# Patient Record
Sex: FEMALE | Race: WHITE | Employment: FULL TIME | URBAN - METROPOLITAN AREA
[De-identification: names, ages, dates, MRNs, and addresses within clinical notes are randomized per-mention and may not be internally consistent; named-entity substitution may affect disease eponyms.]

---

## 2016-08-04 LAB — EJECTION FRACTION: 58

## 2016-12-06 LAB
CREAT SERPL-MCNC: 0.84 MG/DL (ref 0.55–1.02)
GFR SERPL CREATININE-BSD FRML MDRD: >60 ML/MIN
GLUCOSE SERPL-MCNC: 138 MG/DL (ref 70–99)
POTASSIUM SERPL-SCNC: 3.6 MMOL/L (ref 3.5–5.1)

## 2017-01-05 DIAGNOSIS — E11.9 DIABETES MELLITUS, TYPE 2 (H): Primary | ICD-10-CM

## 2017-01-06 RX ORDER — METFORMIN HCL 500 MG
TABLET, EXTENDED RELEASE 24 HR ORAL
Qty: 270 TABLET | Refills: 3 | Status: SHIPPED | OUTPATIENT
Start: 2017-01-06 | End: 2017-10-05

## 2017-01-30 ENCOUNTER — TRANSFERRED RECORDS (OUTPATIENT)
Dept: HEALTH INFORMATION MANAGEMENT | Facility: CLINIC | Age: 72
End: 2017-01-30

## 2017-01-30 LAB — HBA1C MFR BLD: 6.3 % (ref 0–5.7)

## 2017-02-20 ENCOUNTER — TELEPHONE (OUTPATIENT)
Dept: INTERNAL MEDICINE | Facility: CLINIC | Age: 72
End: 2017-02-20

## 2017-02-20 DIAGNOSIS — R73.9 HYPERGLYCEMIA: ICD-10-CM

## 2017-02-20 NOTE — TELEPHONE ENCOUNTER
Pt stopped in pharmacy today and would like a 90 day supply of her maryanne contour next test strips if MD will approve.    Thanks!  [facility]- Float Technician  For [facility]

## 2017-02-22 DIAGNOSIS — E11.9 TYPE 2 DIABETES MELLITUS WITHOUT COMPLICATION, WITHOUT LONG-TERM CURRENT USE OF INSULIN (H): Primary | ICD-10-CM

## 2017-02-22 NOTE — TELEPHONE ENCOUNTER
Please call the patient.     Based on her current medications and great diabetes control, Medicare will only pay for once per day testing, so if she wants to test three times per day, then she is going to have to pay the difference.   The only criteria for medicare to pay for more frequent testing is for insulin use or uncontrolled/poorly controlled diabetes, neither of which is the case here.     Prescription(s) sent electronically to specified pharmacy.

## 2017-02-22 NOTE — TELEPHONE ENCOUNTER
Pharmacy calling.  Pt says she is checking blood sugars 3 times daily.  Need script to reflect this if ok.  Also would like 3 month supply.

## 2017-03-07 ENCOUNTER — TRANSFERRED RECORDS (OUTPATIENT)
Dept: HEALTH INFORMATION MANAGEMENT | Facility: CLINIC | Age: 72
End: 2017-03-07

## 2017-03-23 ENCOUNTER — TELEPHONE (OUTPATIENT)
Dept: OBGYN | Facility: CLINIC | Age: 72
End: 2017-03-23

## 2017-03-23 ENCOUNTER — OFFICE VISIT (OUTPATIENT)
Dept: OBGYN | Facility: CLINIC | Age: 72
End: 2017-03-23
Attending: OBSTETRICS & GYNECOLOGY
Payer: MEDICARE

## 2017-03-23 VITALS — WEIGHT: 254.6 LBS | BODY MASS INDEX: 37.71 KG/M2 | HEIGHT: 69 IN

## 2017-03-23 DIAGNOSIS — Z85.42 HISTORY OF ENDOMETRIAL CANCER: Primary | ICD-10-CM

## 2017-03-23 NOTE — PROGRESS NOTES
"CC/HPI:   Juju Vaughan is a 71 year old female  who presents today for her endometrial cancer surveillance visit. In 9/2014 was diagnosed with grade1, stage 1A endometrial cancer and underwent Davinci hysterectomy, BSO. Since that time she has followed with normal surveillance. Last pap of vaginal cuff was 10/2016 and was normal. She denies any pelvic pain, vaginal discharge or bleeding. Was previously having issues with urinary symptoms, however since stopping lasix her symptoms have 90% resolved. She recently had pacemaker placed which has improved her cardiac symptoms and previous struggles with afib.     HISTORIES:  Patient Active Problem List   Diagnosis     Hot thyroid nodule     Atrial fibrillation with RVR (H)     CHF (congestive heart failure) (H)     Hypothyroidism, unspecified type     Endometrial cancer (H)     CARDIOVASCULAR SCREENING; LDL GOAL LESS THAN 160     Abnormal glucose     Vitamin D deficiency     Tachycardia-bradycardia syndrome (H)     Type 2 diabetes mellitus without complication, without long-term current use of insulin (H)     Past Medical History:   Diagnosis Date     A-fib (H)     Chronic anticoagulation and antiarrhythmic drugs; has required multiple conversions     Antiplatelet or antithrombotic long-term use      Arrhythmia      ASD (atrial septal defect)     repaired at age 40     Difficulty in walking(719.7)      Eating disorder Current    Food addiction per patient; Sees private therapist at [facility]     Hypothyroidism (acquired)     Due to \"hot nodule,\" saw endocrine at [place] and was given radiation therapy, which \"killed the whole thyroid.\" No reported malignancy. Now follows with Endocrine at [facility] and on levothyroxine.     Irregular heart beat      Obesity      Obstructive sleep apnea Current    Uses CPAP; sees specialist     Rh incompatibility      Shortness of breath      Type 2 diabetes mellitus without complication (H)      Walking troubles      Past Surgical History: "   Procedure Laterality Date     BIOPSY       COLONOSCOPY N/A 3/28/2016    Procedure: COMBINED COLONOSCOPY, SINGLE OR MULTIPLE BIOPSY/POLYPECTOMY BY BIOPSY;  Surgeon: Angela Herbert MD;  [facility]     CYSTOSCOPY N/A 9/24/2014    Procedure: CYSTOSCOPY;  Surgeon: Anamaria Sher MD;  [facility]     DAVINCI HYSTERECTOMY TOTAL, BILATERAL SALPINGO-OOPHORECTOMY, COMBINED N/A 9/24/2014    Procedure: COMBINED DAVINCI HYSTERECTOMY TOTAL, SALPINGO-OOPHORECTOMY;  Surgeon: Anamaria Sher MD;  Location: [facility]     H ABLATION FOCAL AFIB       HAND SURGERY      Repair after broken hand from fall; metal kiko placed between radius and ulna     KNEE SURGERY       ORTHOPEDIC SURGERY       REPAIR ATRIAL SEPTAL DEFECT      Age 40     THORACIC SURGERY       TONSILLECTOMY       TUBAL LIGATION      After second vaginal delivery     VASCULAR SURGERY       Current Outpatient Prescriptions   Medication Sig Dispense Refill     blood glucose monitoring (JESUSITA CONTOUR NEXT) test strip Test ONE TO THREE TIMES PER  each 3     blood glucose monitoring (JESUSITA MICROLET) lancets Use to test blood sugar 1 time daily 1 Box 3     metFORMIN (GLUCOPHAGE-XR) 500 MG 24 hr tablet Take three tablets by mouth daily (1,500 mg) 270 tablet 3     levothyroxine (SYNTHROID, LEVOTHROID) 112 MCG tablet Take 1 tablet (112 mcg) by mouth daily 90 tablet 3     levalbuterol (XOPENEX HFA) 45 MCG/ACT inhaler Inhale 2 puffs into the lungs every 4 hours as needed for shortness of breath / dyspnea or wheezing       FUROSEMIDE PO Take 20 mg by mouth 2 times daily       apixaban ANTICOAGULANT (ELIQUIS) 5 MG tablet        acetaminophen (TYLENOL) 325 MG tablet Take 325-650 mg by mouth every 6 hours as needed for mild pain Take 2000 mg daily       Allergies   Allergen Reactions     Animal Dander      Contrast Dye Other (See Comments)     Family history of reaction.  Patient has not had reaction.       Flecainide Muscle Pain (Myalgia)     Iodine I 131  "Tositumomab      Morphine Hcl Itching     Percocet [Oxycodone-Acetaminophen]      Perfume Difficulty breathing     Seafood      Dehydrated with high fever     Tape [Adhesive Tape] Blisters     All tape, dermabond, except paper tape     Vicodin [Hydrocodone-Acetaminophen] Other (See Comments)     Flu-like symptoms     Social History     Social History     Marital status:      Spouse name: N/A     Number of children: N/A     Years of education: N/A     Occupational History     Not on file.     Social History Main Topics     Smoking status: Never Smoker     Smokeless tobacco: Never Used     Alcohol use No     Drug use: No     Sexual activity: Not Currently     Partners: Male     Birth control/ protection: Surgical      Comment: hysterectomy     Other Topics Concern     Not on file     Social History Narrative     Family History   Problem Relation Age of Onset     Breast Cancer No family hx of      Cancer - colorectal No family hx of      Anxiety Disorder No family hx of      DIABETES No family hx of      Hypertension No family hx of      OSTEOPOROSIS No family hx of      CEREBROVASCULAR DISEASE No family hx of      Obesity No family hx of             Review Of Systems:  C: NEGATIVE for fever, chills  E: NEGATIVE for vision changes   R: NEGATIVE for significant cough or SOB  CV: NEGATIVE for chest pain, palpitations   GI: NEGATIVE for nausea, abdominal pain, heartburn, or change in bowel habits  : NEGATIVE for frequency, dysuria, or hematuria  M: NEGATIVE for significant arthralgias or myalgia  N: NEGATIVE for weakness, dizziness or paresthesias or headache    EXAM:  Ht 1.74 m (5' 8.5\")  Wt 115.5 kg (254 lb 9.6 oz)  BMI 38.15 kg/m2  Body mass index is 38.15 kg/(m^2).    General - pleasant female in no acute distress.  Skin - no suspicious lesions or rashes  EENT-  PERRLA, euthyroid with out palpable nodules  Neck - supple without lymphadenopathy.  Lungs - clear to auscultation bilaterally.  Heart - regular " rate and rhythm without murmur.  Abdomen - soft, nontender, nondistended, no masses or organomegaly noted.  Musculoskeletal - no gross deformities.  Neurological - normal strength, sensation, and mental status.  Pelvic - EG: normal  female, vulva reveals no erythema or lesions.   BUS: within normal limits.  Vagina: atrophic, no lesions polyps or suspicious  discharge.     Cervix: surgically absent  Uterus: surgically absent  Adnexa: no masses or tenderness.  Anus- normal, no lesions.  Rectovaginal - deferred.    ASSESSMENT/PLAN  71 year old with  H/o Stage 1A, grade 1 endometrial cancer here for 6 month endometrial cancer surveillance.     Due for mammogram- will do today  Follow up in 6 months    Ayde Terry MD

## 2017-03-23 NOTE — MR AVS SNAPSHOT
After Visit Summary   3/23/2017    Juju Vaughan    MRN: [id number]           Patient Information     Date Of Birth          1945        Visit Information        Provider Department      3/23/2017 12:45 PM Ayde Terry MD [facility]        Today's Diagnoses     History of endometrial cancer    -  1       Follow-ups after your visit        Your next 10 appointments already scheduled     May 02, 2017  1:00 PM CDT   (Arrive by 12:30 PM)   RETURN ENDOCRINE with Loida Christiansen MD   [facility] End[facility] ([facility])    [address] [place] [id number] [phone]              Who to contact     Please call your clinic at [phone] to:    Ask questions about your health    Make or cancel appointments    Discuss your medicines    Learn about your test results    Speak to your doctor   If you have compliments or concerns about an experience at your clinic, or if you wish to file a complaint, please contact [facility] Patient Relations at [phone] or email us at [email or web address]         Additional Information About Your Visit        MyChart Information     [employer] gives you secure access to your electronic health record. If you see a primary care provider, you can also send messages to your care team and make appointments. If you have questions, please call your primary care clinic.  If you do not have a primary care provider, please call [phone] and they will assist you.      [employer] is an electronic gateway that provides easy, online access to your medical records. With [employer], you can request a clinic appointment, read your test results, renew a prescription or communicate with your care team.     To access your existing account, please contact your [facility] or call [phone] for assistance.        Care  "EveryWhere ID     This is your Care EveryWhere ID. This could be used by other organizations to access your [facility] medical records  [id number]        Your Vitals Were     Height BMI (Body Mass Index)                1.74 m (5' 8.5\") 38.15 kg/m2           Blood Pressure from Last 3 Encounters:   11/10/16 147/80   10/12/16 113/64   10/06/16 130/80    Weight from Last 3 Encounters:   03/23/17 115.5 kg (254 lb 9.6 oz)   11/10/16 114.3 kg (252 lb)   10/12/16 112.1 kg (247 lb 3.2 oz)              Today, you had the following     No orders found for display       Primary Care Provider Office Phone # Fax #    Michele Hernandez [phone]       [facility] [address] [place] [id number]        Thank you!     Thank you for choosing [facility]  for your care. Our goal is always to provide you with excellent care. Hearing back from our patients is one way we can continue to improve our services. Please take a few minutes to complete the written survey that you may receive in the mail after your visit with us. Thank you!             Your Updated Medication List - Protect others around you: Learn how to safely use, store and throw away your medicines at www.disposemymeds.org.          This list is accurate as of: 3/23/17  1:10 PM.  Always use your most recent med list.                   Brand Name Dispense Instructions for use    blood glucose monitoring lancets     1 Box    Use to test blood sugar 1 time daily       blood glucose monitoring test strip    JESUSITA CONTOUR NEXT    100 each    Test ONE TO THREE TIMES PER DAY       ELIQUIS 5 MG tablet   Generic drug:  apixaban ANTICOAGULANT          FUROSEMIDE PO      Take 20 mg by mouth 2 times daily       levalbuterol 45 MCG/ACT Inhaler    XOPENEX HFA     Inhale 2 puffs into the lungs every 4 hours as needed for shortness of breath / dyspnea or wheezing       levothyroxine 112 MCG tablet    SYNTHROID/LEVOTHROID    90 " tablet    Take 1 tablet (112 mcg) by mouth daily       metFORMIN 500 MG 24 hr tablet    GLUCOPHAGE-XR    270 tablet    Take three tablets by mouth daily (1,500 mg)       TYLENOL 325 MG tablet   Generic drug:  acetaminophen      Take 325-650 mg by mouth every 6 hours as needed for mild pain Take 2000 mg daily

## 2017-03-23 NOTE — TELEPHONE ENCOUNTER
----- Message from Ayde Terry MD sent at 3/23/2017  1:56 PM CDT -----  She should ask Dr. Fernando team about that. THanks!  ----- Message -----     From: Beulah Finch RN     Sent: 3/23/2017   1:18 PM       To: Ayde Terry MD    Hi Dr. Terry, As this patient was leaving she asked if you knew about the tissue she had collected after her hysterectomy that they were going to do genetic testing on. She would like this report for her children, she was unable to find it in my chart, do you have any ideas? Thank you Beulah SIMPSON

## 2017-03-23 NOTE — LETTER
"3/23/2017       RE: Juju Vaughan  [address]   [place] [address]     Dear Colleague,    Thank you for referring your patient, Juju Vaughan, to the [facility] at [facility]. Please see a copy of my visit note below.    CC/HPI:   Juju Vaughan is a 71 year old female  who presents today for her endometrial cancer surveillance visit. In 9/2014 was diagnosed with grade1, stage 1A endometrial cancer and underwent Davinci hysterectomy, BSO. Since that time she has followed with normal surveillance. Last pap of vaginal cuff was 10/2016 and was normal. She denies any pelvic pain, vaginal discharge or bleeding. Was previously having issues with urinary symptoms, however since stopping lasix her symptoms have 90% resolved. She recently had pacemaker placed which has improved her cardiac symptoms and previous struggles with afib.     HISTORIES:  Patient Active Problem List   Diagnosis     Hot thyroid nodule     Atrial fibrillation with RVR (H)     CHF (congestive heart failure) (H)     Hypothyroidism, unspecified type     Endometrial cancer (H)     CARDIOVASCULAR SCREENING; LDL GOAL LESS THAN 160     Abnormal glucose     Vitamin D deficiency     Tachycardia-bradycardia syndrome (H)     Type 2 diabetes mellitus without complication, without long-term current use of insulin (H)     Past Medical History:   Diagnosis Date     A-fib (H)     Chronic anticoagulation and antiarrhythmic drugs; has required multiple conversions     Antiplatelet or antithrombotic long-term use      Arrhythmia      ASD (atrial septal defect)     repaired at age 40     Difficulty in walking(719.7)      Eating disorder Current    Food addiction per patient; Sees private therapist at [facility]     Hypothyroidism (acquired)     Due to \"hot nodule,\" saw endocrine at [place] and was given radiation therapy, which \"killed the whole thyroid.\" No reported malignancy. Now follows with " Endocrine at [facility] and on levothyroxine.     Irregular heart beat      Obesity      Obstructive sleep apnea Current    Uses CPAP; sees specialist     Rh incompatibility      Shortness of breath      Type 2 diabetes mellitus without complication (H)      Walking troubles      Past Surgical History:   Procedure Laterality Date     BIOPSY       COLONOSCOPY N/A 3/28/2016    Procedure: COMBINED COLONOSCOPY, SINGLE OR MULTIPLE BIOPSY/POLYPECTOMY BY BIOPSY;  Surgeon: Angela Herbert MD;  [facility]     CYSTOSCOPY N/A 9/24/2014    Procedure: CYSTOSCOPY;  Surgeon: Anamaria Sher MD;  [facility]     DAVINCI HYSTERECTOMY TOTAL, BILATERAL SALPINGO-OOPHORECTOMY, COMBINED N/A 9/24/2014    Procedure: COMBINED DAVINCI HYSTERECTOMY TOTAL, SALPINGO-OOPHORECTOMY;  Surgeon: Anamaria Sher MD;  [facility]     H ABLATION FOCAL AFIB       HAND SURGERY      Repair after broken hand from fall; metal kiko placed between radius and ulna     KNEE SURGERY       ORTHOPEDIC SURGERY       REPAIR ATRIAL SEPTAL DEFECT      Age 40     THORACIC SURGERY       TONSILLECTOMY       TUBAL LIGATION      After second vaginal delivery     VASCULAR SURGERY       Current Outpatient Prescriptions   Medication Sig Dispense Refill     blood glucose monitoring (JESUSITA CONTOUR NEXT) test strip Test ONE TO THREE TIMES PER  each 3     blood glucose monitoring (JESUSITA MICROLET) lancets Use to test blood sugar 1 time daily 1 Box 3     metFORMIN (GLUCOPHAGE-XR) 500 MG 24 hr tablet Take three tablets by mouth daily (1,500 mg) 270 tablet 3     levothyroxine (SYNTHROID, LEVOTHROID) 112 MCG tablet Take 1 tablet (112 mcg) by mouth daily 90 tablet 3     levalbuterol (XOPENEX HFA) 45 MCG/ACT inhaler Inhale 2 puffs into the lungs every 4 hours as needed for shortness of breath / dyspnea or wheezing       FUROSEMIDE PO Take 20 mg by mouth 2 times daily       apixaban ANTICOAGULANT (ELIQUIS) 5 MG tablet        acetaminophen (TYLENOL) 325 MG tablet  "Take 325-650 mg by mouth every 6 hours as needed for mild pain Take 2000 mg daily       Allergies   Allergen Reactions     Animal Dander      Contrast Dye Other (See Comments)     Family history of reaction.  Patient has not had reaction.       Flecainide Muscle Pain (Myalgia)     Iodine I 131 Tositumomab      Morphine Hcl Itching     Percocet [Oxycodone-Acetaminophen]      Perfume Difficulty breathing     Seafood      Dehydrated with high fever     Tape [Adhesive Tape] Blisters     All tape, dermabond, except paper tape     Vicodin [Hydrocodone-Acetaminophen] Other (See Comments)     Flu-like symptoms     Social History     Social History     Marital status:      Spouse name: N/A     Number of children: N/A     Years of education: N/A     Occupational History     Not on file.     Social History Main Topics     Smoking status: Never Smoker     Smokeless tobacco: Never Used     Alcohol use No     Drug use: No     Sexual activity: Not Currently     Partners: Male     Birth control/ protection: Surgical      Comment: hysterectomy     Other Topics Concern     Not on file     Social History Narrative     Family History   Problem Relation Age of Onset     Breast Cancer No family hx of      Cancer - colorectal No family hx of      Anxiety Disorder No family hx of      DIABETES No family hx of      Hypertension No family hx of      OSTEOPOROSIS No family hx of      CEREBROVASCULAR DISEASE No family hx of      Obesity No family hx of             Review Of Systems:  C: NEGATIVE for fever, chills  E: NEGATIVE for vision changes   R: NEGATIVE for significant cough or SOB  CV: NEGATIVE for chest pain, palpitations   GI: NEGATIVE for nausea, abdominal pain, heartburn, or change in bowel habits  : NEGATIVE for frequency, dysuria, or hematuria  M: NEGATIVE for significant arthralgias or myalgia  N: NEGATIVE for weakness, dizziness or paresthesias or headache    EXAM:  Ht 1.74 m (5' 8.5\")  Wt 115.5 kg (254 lb 9.6 oz)  " BMI 38.15 kg/m2  Body mass index is 38.15 kg/(m^2).    General - pleasant female in no acute distress.  Skin - no suspicious lesions or rashes  EENT-  PERRLA, euthyroid with out palpable nodules  Neck - supple without lymphadenopathy.  Lungs - clear to auscultation bilaterally.  Heart - regular rate and rhythm without murmur.  Abdomen - soft, nontender, nondistended, no masses or organomegaly noted.  Musculoskeletal - no gross deformities.  Neurological - normal strength, sensation, and mental status.  Pelvic - EG: normal  female, vulva reveals no erythema or lesions.   BUS: within normal limits.  Vagina: atrophic, no lesions polyps or suspicious  discharge.     Cervix: surgically absent  Uterus: surgically absent  Adnexa: no masses or tenderness.  Anus- normal, no lesions.  Rectovaginal - deferred.    ASSESSMENT/PLAN  71 year old with  H/o Stage 1A, grade 1 endometrial cancer here for 6 month endometrial cancer surveillance.     Due for mammogram- will do today  Follow up in 6 months    Ayde Terry MD

## 2017-03-27 ENCOUNTER — MYC REFILL (OUTPATIENT)
Dept: INTERNAL MEDICINE | Facility: CLINIC | Age: 72
End: 2017-03-27

## 2017-03-27 DIAGNOSIS — E11.9 TYPE 2 DIABETES MELLITUS WITHOUT COMPLICATION, WITHOUT LONG-TERM CURRENT USE OF INSULIN (H): ICD-10-CM

## 2017-03-28 NOTE — TELEPHONE ENCOUNTER
"Message from [employer]:  Original authorizing provider: Michele Hernandez MD    Juju SCHUM[place]Julius [place] would like a refill of the following medications:  blood glucose monitoring (JESUSITA CONTOUR NEXT) test strip [Michele Hernandez MD]    Preferred pharmacy: [address]    Comment:  Greetings, My glucose strip reader, \"Contour next\" by Jesusita, is defective. The spring action no longer works. When the lancet is pressed it does not catch/click in place. If possible, I would appreciate a new prescription for one sent to [facility], the one on my chart in [place]. Many thanks in advance!   "

## 2017-04-21 ASSESSMENT — ENCOUNTER SYMPTOMS
MYALGIAS: 1
MUSCLE WEAKNESS: 1

## 2017-05-02 ENCOUNTER — OFFICE VISIT (OUTPATIENT)
Dept: ENDOCRINOLOGY | Facility: CLINIC | Age: 72
End: 2017-05-02

## 2017-05-02 VITALS
DIASTOLIC BLOOD PRESSURE: 83 MMHG | WEIGHT: 258.1 LBS | HEIGHT: 69 IN | SYSTOLIC BLOOD PRESSURE: 141 MMHG | BODY MASS INDEX: 38.23 KG/M2 | HEART RATE: 86 BPM

## 2017-05-02 DIAGNOSIS — E06.3 HASHIMOTO'S THYROIDITIS: ICD-10-CM

## 2017-05-02 DIAGNOSIS — R73.9 HYPERGLYCEMIA: ICD-10-CM

## 2017-05-02 DIAGNOSIS — E11.9 TYPE 2 DIABETES MELLITUS WITHOUT COMPLICATION, WITHOUT LONG-TERM CURRENT USE OF INSULIN (H): ICD-10-CM

## 2017-05-02 LAB
HBA1C MFR BLD: 6.6 % (ref 4.3–6)
T4 FREE SERPL-MCNC: 1.1 NG/DL (ref 0.76–1.46)
TSH SERPL DL<=0.05 MIU/L-ACNC: 2.12 MU/L (ref 0.4–4)

## 2017-05-02 RX ORDER — LEVOTHYROXINE SODIUM 112 UG/1
112 TABLET ORAL DAILY
Qty: 90 TABLET | Refills: 3 | Status: SHIPPED | OUTPATIENT
Start: 2017-05-02

## 2017-05-02 ASSESSMENT — PAIN SCALES - GENERAL: PAINLEVEL: NO PAIN (0)

## 2017-05-02 NOTE — MR AVS SNAPSHOT
After Visit Summary   5/2/2017    Juju Vaughan    MRN: [id number]           Patient Information     Date Of Birth          1945        Visit Information        Provider Department      5/2/2017 1:00 PM Loida Christiansen MD [facility]        Today's Diagnoses     Type 2 diabetes mellitus without complication, without long-term current use of insulin (H)        Hyperglycemia        Hashimoto's thyroiditis          Care Instructions    To expedite your medication refill(s), please contact your pharmacy and have them fax a refill request to: [phone].  *Please allow 3 business days for routine medication refills.  *Please allow 5 business days for controlled substance medication refills.  --------------------  For scheduling appointments (including lab work), please request an appointment through [employer], or call: [phone].    For questions for your provider or the endocrine nurse, please send a [employer] message.  For after-hours urgent issues, please dial [phone], and ask to speak with the Endocrinologist On-Call.  --------------------  Please Note: If you are active on [employer], all future test results will be sent by [employer] message only and will no longer be sent by mail. You may also receive communication directly from your physician.          Follow-ups after your visit        Follow-up notes from your care team     Return in about 6 months (around 11/2/2017).      Your next 10 appointments already scheduled     May 02, 2017  2:15 PM CDT   LAB with [facility] ([facility] [place])    [address] [place] [id number] [phone]           Patient must bring picture ID.  Patient should be prepared to give a urine specimen  Please do not eat 10-12 hours before your appointment if you are coming in fasting for labs on lipids, cholesterol, or glucose (sugar).  Pregnant women should follow their Care  Team instructions. Water with medications is okay. Do not drink coffee or other fluids.   If you have concerns about taking  your medications, please ask at office or if scheduling via [employer], send a message by clicking on Secure Messaging, Message Your Care Team.            Nov 07, 2017 12:00 PM CST   (Arrive by 11:45 AM)   RETURN ENDOCRINE with Loida Christiansen MD   [facility] ([facility] [place])    [address] [id number] [phone]              Future tests that were ordered for you today     Open Future Orders        Priority Expected Expires Ordered    T4 free Routine 5/2/2017 8/2/2017 5/2/2017    TSH Routine  5/2/2018 5/2/2017            Who to contact     Please call your clinic at [phone] to:    Ask questions about your health    Make or cancel appointments    Discuss your medicines    Learn about your test results    Speak to your doctor   If you have compliments or concerns about an experience at your clinic, or if you wish to file a complaint, please contact [facility] Patient Relations at [phone] or email us at [email or web address]         Additional Information About Your Visit        [employer] Information     [employer] gives you secure access to your electronic health record. If you see a primary care provider, you can also send messages to your care team and make appointments. If you have questions, please call your primary care clinic.  If you do not have a primary care provider, please call [phone] and they will assist you.      [employer] is an electronic gateway that provides easy, online access to your medical records. With [employer], you can request a clinic appointment, read your test results, renew a prescription or communicate with your care team.     To access your existing account, please contact your [facility] or call [phone]  "for assistance.        Care EveryWhere ID     This is your Care EveryWhere ID. This could be used by other organizations to access your [facility] medical records  [id number]        Your Vitals Were     Pulse Height BMI (Body Mass Index)             86 1.74 m (5' 8.5\") 38.67 kg/m2          Blood Pressure from Last 3 Encounters:   05/02/17 141/83   11/10/16 147/80   10/12/16 113/64    Weight from Last 3 Encounters:   05/02/17 117.1 kg (258 lb 1.6 oz)   03/23/17 115.5 kg (254 lb 9.6 oz)   11/10/16 114.3 kg (252 lb)                 Today's Medication Changes          These changes are accurate as of: 5/2/17  2:05 PM.  If you have any questions, ask your nurse or doctor.               These medicines have changed or have updated prescriptions.        Dose/Directions    blood glucose monitoring lancets   This may have changed:  additional instructions   Used for:  Hyperglycemia   Changed by:  Loida Christiansen MD        Use to test blood sugar 3 times daily   Quantity:  3 Box   Refills:  3       blood glucose monitoring test strip   Commonly known as:  JESUSITA CONTOUR NEXT   This may have changed:  additional instructions   Used for:  Type 2 diabetes mellitus without complication, without long-term current use of insulin (H)   Changed by:  Loida Christiansen MD        Test THREE TIMES PER DAY   Quantity:  270 each   Refills:  3       metFORMIN 500 MG 24 hr tablet   Commonly known as:  GLUCOPHAGE-XR   This may have changed:  additional instructions   Used for:  Diabetes mellitus, type 2 (H)        Take three tablets by mouth daily (1,500 mg)   Quantity:  270 tablet   Refills:  3            Where to get your medicines      These medications were sent to [facility] [address] [address]     Phone:  [phone]     blood glucose monitoring lancets    blood glucose monitoring test strip    levothyroxine 112 MCG tablet             "    Primary Care Provider Office Phone # Fax #    Michele Jose Hernandez [phone]       [facility] [address] [facility] [address]        Thank you!     Thank you for choosing [facility]  for your care. Our goal is always to provide you with excellent care. Hearing back from our patients is one way we can continue to improve our services. Please take a few minutes to complete the written survey that you may receive in the mail after your visit with us. Thank you!             Your Updated Medication List - Protect others around you: Learn how to safely use, store and throw away your medicines at www.disposemymeds.org.          This list is accurate as of: 5/2/17  2:05 PM.  Always use your most recent med list.                   Brand Name Dispense Instructions for use    blood glucose monitoring lancets     3 Box    Use to test blood sugar 3 times daily       blood glucose monitoring meter device kit     1 kit    Use to test blood sugars 1-3 times daily or as directed.       blood glucose monitoring test strip    JESUSITA CONTOUR NEXT    270 each    Test THREE TIMES PER DAY       ELIQUIS 5 MG tablet   Generic drug:  apixaban ANTICOAGULANT          FUROSEMIDE PO      Take 20 mg by mouth 2 times daily       levalbuterol 45 MCG/ACT Inhaler    XOPENEX HFA     Inhale 2 puffs into the lungs every 4 hours as needed for shortness of breath / dyspnea or wheezing       levothyroxine 112 MCG tablet    SYNTHROID/LEVOTHROID    90 tablet    Take 1 tablet (112 mcg) by mouth daily       metFORMIN 500 MG 24 hr tablet    GLUCOPHAGE-XR    270 tablet    Take three tablets by mouth daily (1,500 mg)       TYLENOL 325 MG tablet   Generic drug:  acetaminophen      Take 325-650 mg by mouth every 6 hours as needed for mild pain Take 2000 mg daily
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
## 2017-05-08 ENCOUNTER — TELEPHONE (OUTPATIENT)
Dept: ENDOCRINOLOGY | Facility: CLINIC | Age: 72
End: 2017-05-08

## 2017-05-08 NOTE — TELEPHONE ENCOUNTER
Dr. Christiansen,    We received a prescription for test strips for Juju on 5/2/17.  In order for Medicare to pay for the prescription we need some additional documentation in her office visit from 5/2/17.    Can you amend her office visit from 5/2/17 and add a note as to why she needs to test more than once daily which is what Medicare will cover?  She will also need to have an office visit every 6 months in order to keep Medicare paying for the prescription.    Please feel free to call me with any questions.    Thank you,    Peter Avila, PharmD  [facility]  [phone]

## 2017-06-19 ENCOUNTER — TRANSFERRED RECORDS (OUTPATIENT)
Dept: HEALTH INFORMATION MANAGEMENT | Facility: CLINIC | Age: 72
End: 2017-06-19

## 2017-07-20 ENCOUNTER — ANTICOAGULATION THERAPY VISIT (OUTPATIENT)
Dept: ANTICOAGULATION | Facility: CLINIC | Age: 72
End: 2017-07-20
Payer: MEDICARE

## 2017-07-20 DIAGNOSIS — I48.91 ATRIAL FIBRILLATION WITH RVR (H): ICD-10-CM

## 2017-07-20 LAB — INR POINT OF CARE: 1.5 (ref 0.86–1.14)

## 2017-07-20 PROCEDURE — 36416 COLLJ CAPILLARY BLOOD SPEC: CPT

## 2017-07-20 PROCEDURE — 99207 ZZC NO CHARGE NURSE ONLY: CPT

## 2017-07-20 PROCEDURE — 85610 PROTHROMBIN TIME: CPT | Mod: QW

## 2017-07-20 RX ORDER — WARFARIN SODIUM 2 MG/1
2 TABLET ORAL DAILY
COMMUNITY

## 2017-07-20 NOTE — PROGRESS NOTES
ANTICOAGULATION FOLLOW-UP CLINIC VISIT    Patient Name:  Juju Vaughan  Date:  7/20/2017  Contact Type:  Face to Face    SUBJECTIVE:     Patient Findings     Positives No Problem Findings    Comments Changing from eliquis to warfarin by gildardo matute MD           OBJECTIVE    INR Protime   Date Value Ref Range Status   07/20/2017 1.5 (A) 0.86 - 1.14 Final       ASSESSMENT / PLAN  INR assessment SUB    Recheck INR In: 4 DAYS    INR Location Clinic      Anticoagulation Summary as of 7/20/2017     INR goal 2.0-3.0   Today's INR 1.5!   Maintenance plan No maintenance plan   Full instructions 7/20: 6 mg; 7/21: 6 mg; 7/22: 4 mg; 7/23: 4 mg   Plan last modified Lo Hamlin, RN (7/20/2017)   Next INR check 7/24/2017   Target end date     Indications   Atrial fibrillation (H) (Resolved) [I48.91]  Atrial fibrillation with RVR (H) [I48.91]         Anticoagulation Episode Summary     INR check location     Preferred lab     Send INR reminders to  [facility]    Comments             See the Encounter Report to view Anticoagulation Flowsheet and Dosing Calendar (Go to Encounters tab in chart review, and find the Anticoagulation Therapy Visit)        Lo Hamlin RN

## 2017-07-20 NOTE — MR AVS SNAPSHOT
Juju Vaughan   7/20/2017 11:30 AM   Anticoagulation Therapy Visit    Description:  71 year old female   Provider:   ANTICOAGULATION CLINIC   Department:   Anti Coagulation           INR as of 7/20/2017     Today's INR 1.5!      Anticoagulation Summary as of 7/20/2017     INR goal 2.0-3.0   Today's INR 1.5!   Full instructions 7/20: 6 mg; 7/21: 6 mg; 7/22: 4 mg; 7/23: 4 mg   Next INR check 7/24/2017    Indications   Atrial fibrillation (H) (Resolved) [I48.91]  Atrial fibrillation with RVR (H) [I48.91]         Your next Anticoagulation Clinic appointment(s)     Jul 24, 2017  2:30 PM CDT   Anticoagulation Visit with  ANTICOAGULATION CLINIC   [facility] ([facility])    [address] [place] [address] [phone]              Contact Numbers     [facility]  Please call  [phone] to cancel and/or reschedule your appointment   Please call  [phone] with any problems or questions regarding your therapy.        July 2017 Details    Sun Mon Tue Wed Thu Fri Sat           1                 2               3               4               5               6               7               8                 9               10               11               12               13               14               15                 16               17               18               19               20      6 mg   See details      21      6 mg         22      4 mg           23      4 mg         24            25               26               27               28               29                 30               31                     Date Details   07/20 This INR check       Date of next INR:  7/24/2017         How to take your warfarin dose     To take:  4 mg Take 2 of the 2 mg tablets.    To take:  6 mg Take 3 of the 2 mg tablets.

## 2017-07-24 ENCOUNTER — ANTICOAGULATION THERAPY VISIT (OUTPATIENT)
Dept: ANTICOAGULATION | Facility: CLINIC | Age: 72
End: 2017-07-24
Payer: MEDICARE

## 2017-07-24 DIAGNOSIS — I48.91 A-FIB (H): Primary | ICD-10-CM

## 2017-07-24 DIAGNOSIS — I48.91 ATRIAL FIBRILLATION WITH RVR (H): ICD-10-CM

## 2017-07-24 LAB — INR POINT OF CARE: 1.8 (ref 0.86–1.14)

## 2017-07-24 PROCEDURE — 85610 PROTHROMBIN TIME: CPT | Mod: QW

## 2017-07-24 PROCEDURE — 36416 COLLJ CAPILLARY BLOOD SPEC: CPT

## 2017-07-24 RX ORDER — WARFARIN SODIUM 4 MG/1
TABLET ORAL
Qty: 180 TABLET | Refills: 0 | Status: SHIPPED | OUTPATIENT
Start: 2017-07-24 | End: 2017-10-24

## 2017-07-24 NOTE — MR AVS SNAPSHOT
Juju Vaughan   7/24/2017 2:30 PM   Anticoagulation Therapy Visit    Description:  71 year old female   Provider:   ANTICOAGULATION CLINIC   Department:   Anti Coagulation           INR as of 7/24/2017     Today's INR 1.8!      Anticoagulation Summary as of 7/24/2017     INR goal 2.0-3.0   Today's INR 1.8!   Full instructions 7/24: 8 mg; 7/25: 8 mg; 7/26: 6 mg   Next INR check 7/27/2017    Indications   Atrial fibrillation (H) (Resolved) [I48.91]  Atrial fibrillation with RVR (H) [I48.91]         Your next Anticoagulation Clinic appointment(s)     Jul 24, 2017  2:30 PM CDT   Anticoagulation Visit with  ANTICOAGULATION CLINIC   [facility]    [address] [address] [phone]            Jul 27, 2017  1:45 PM CDT   Anticoagulation Visit with  ANTICOAGULATION CLINIC   [facility] ([facility])    [address] [address] [phone]              [facility]     [facility]  Please call  [phone] to cancel and/or reschedule your appointment   Please call  [phone] with any problems or questions regarding your therapy.        July 2017 Details    Sun Mon Tue Wed Thu Fri Sat           1                 2               3               4               5               6               7               8                 9               10               11               12               13               14               15                 16               17               18               19               20               21               22                 23               24      8 mg   See details      25      8 mg         26      6 mg         27            28               29                 30               31                     Date Details   07/24 This INR check       Date of next INR:  7/27/2017         How to take your warfarin  dose     To take:  6 mg Take 1.5 of the 4 mg tablets.    To take:  8 mg Take 2 of the 4 mg tablets.

## 2017-07-24 NOTE — PROGRESS NOTES
ANTICOAGULATION FOLLOW-UP CLINIC VISIT    Patient Name:  Juju Vaughan  Date:  7/24/2017  Contact Type:  Face to Face    SUBJECTIVE:     Patient Findings     Positives No Problem Findings           OBJECTIVE    INR Protime   Date Value Ref Range Status   07/24/2017 1.8 (A) 0.86 - 1.14 Final       ASSESSMENT / PLAN  INR assessment SUB    Recheck INR In: 3 DAYS    INR Location Clinic      Anticoagulation Summary as of 7/24/2017     INR goal 2.0-3.0   Today's INR 1.8!   Maintenance plan No maintenance plan   Full instructions 7/24: 8 mg; 7/25: 8 mg; 7/26: 6 mg   Plan last modified Lo Hamlin, RN (7/24/2017)   Next INR check 7/27/2017   Target end date     Indications   Atrial fibrillation (H) (Resolved) [I48.91]  Atrial fibrillation with RVR (H) [I48.91]         Anticoagulation Episode Summary     INR check location     Preferred lab     Send INR reminders to  [facility]    Comments             See the Encounter Report to view Anticoagulation Flowsheet and Dosing Calendar (Go to Encounters tab in chart review, and find the Anticoagulation Therapy Visit)        Lo Hamlin RN

## 2017-07-27 ENCOUNTER — ANTICOAGULATION THERAPY VISIT (OUTPATIENT)
Dept: ANTICOAGULATION | Facility: CLINIC | Age: 72
End: 2017-07-27
Payer: MEDICARE

## 2017-07-27 DIAGNOSIS — I48.91 ATRIAL FIBRILLATION WITH RVR (H): ICD-10-CM

## 2017-07-27 LAB — INR POINT OF CARE: 2.3 (ref 0.86–1.14)

## 2017-07-27 PROCEDURE — 85610 PROTHROMBIN TIME: CPT | Mod: QW

## 2017-07-27 PROCEDURE — 36416 COLLJ CAPILLARY BLOOD SPEC: CPT

## 2017-07-27 PROCEDURE — 99207 ZZC NO CHARGE NURSE ONLY: CPT

## 2017-07-27 NOTE — MR AVS SNAPSHOT
Juju Vaughan   7/27/2017 1:45 PM   Anticoagulation Therapy Visit    Description:  71 year old female   Provider:   ANTICOAGULATION CLINIC   Department:   Anti Coagulation           INR as of 7/27/2017     Today's INR 2.3      Anticoagulation Summary as of 7/27/2017     INR goal 2.0-3.0   Today's INR 2.3   Full instructions 7/27: 6 mg; 7/28: 6 mg; 7/29: 6 mg; 7/30: 6 mg   Next INR check 7/31/2017    Indications   Atrial fibrillation (H) (Resolved) [I48.91]  Atrial fibrillation with RVR (H) [I48.91]         Your next Anticoagulation Clinic appointment(s)     Jul 27, 2017  1:45 PM CDT   Anticoagulation [facility] with  [facility]ATION CLINIC   [facility]    [address] [place] [id number] [phone]            Jul 31, 2017 11:00 AM CDT   Anticoagulation Visit with  ANTICOAGULATION CLINIC   [facility]    [address] [id number] [phone]              Contact Numbers     [facility]  Please call  [phone] to cancel and/or reschedule your appointment   Please call  [phone] with any problems or questions regarding your therapy.        July 2017 Details    Sun Mon Tue Wed Thu Fri Sat           1                 2               3               4               5               6               7               8                 9               10               11               12               13               14               15                 16               17               18               19               20               21               22                 23               24               25               26               27      6 mg   See details      28      6 mg         29      6 mg           30      6 mg         31                  Date Details   07/27 This INR check       Date of next INR:  7/31/2017         How to take  your warfarin dose     To take:  6 mg Take 1.5 of the 4 mg tablets.

## 2017-07-27 NOTE — PROGRESS NOTES
ANTICOAGULATION FOLLOW-UP CLINIC VISIT    Patient Name:  Juju Vaughan  Date:  7/27/2017  Contact Type:  Face to Face    SUBJECTIVE:        OBJECTIVE    INR Protime   Date Value Ref Range Status   07/27/2017 2.3 (A) 0.86 - 1.14 Final       ASSESSMENT / PLAN  INR assessment THER    Recheck INR In: 4 DAYS    INR Location Clinic      Anticoagulation Summary as of 7/27/2017     INR goal 2.0-3.0   Today's INR 2.3   Maintenance plan No maintenance plan   Full instructions 7/27: 6 mg; 7/28: 6 mg; 7/29: 6 mg; 7/30: 6 mg   Plan last modified Lo Hamlin, RN (7/24/2017)   Next INR check 7/31/2017   Target end date     Indications   Atrial fibrillation (H) (Resolved) [I48.91]  Atrial fibrillation with RVR (H) [I48.91]         Anticoagulation Episode Summary     INR check location     Preferred lab     Send INR reminders to  [facility]    Comments             See the Encounter Report to view Anticoagulation Flowsheet and Dosing Calendar (Go to Encounters tab in chart review, and find the Anticoagulation Therapy Visit)    Dosage adjustment made based on physician directed care plan.    Patricia Dinero, RN

## 2017-07-31 ENCOUNTER — ANTICOAGULATION THERAPY VISIT (OUTPATIENT)
Dept: ANTICOAGULATION | Facility: CLINIC | Age: 72
End: 2017-07-31
Payer: MEDICARE

## 2017-07-31 DIAGNOSIS — I48.91 ATRIAL FIBRILLATION WITH RVR (H): ICD-10-CM

## 2017-07-31 LAB — INR POINT OF CARE: 2.7 (ref 0.86–1.14)

## 2017-07-31 PROCEDURE — 36416 COLLJ CAPILLARY BLOOD SPEC: CPT

## 2017-07-31 PROCEDURE — 99207 ZZC NO CHARGE NURSE ONLY: CPT

## 2017-07-31 PROCEDURE — 85610 PROTHROMBIN TIME: CPT | Mod: QW

## 2017-07-31 NOTE — PROGRESS NOTES
ANTICOAGULATION FOLLOW-UP CLINIC VISIT    Patient Name:  Juju Vaughan  Date:  7/31/2017  Contact Type:  Face to Face    SUBJECTIVE:     Patient Findings     Positives Initiation of therapy           OBJECTIVE    INR Protime   Date Value Ref Range Status   07/31/2017 2.7 (A) 0.86 - 1.14 Final       ASSESSMENT / PLAN  INR assessment THER    Recheck INR In: 1 WEEK    INR Location Clinic      Anticoagulation Summary as of 7/31/2017     INR goal 2.0-3.0   Today's INR 2.7   Maintenance plan 8 mg (4 mg x 2) on Mon, Fri; 4 mg (4 mg x 1) all other days   Full instructions 7/31: 8 mg; 8/1: 6 mg; 8/2: 6 mg; 8/3: 6 mg; 8/5: 6 mg; 8/6: 6 mg; Otherwise 8 mg on Mon, Fri; 4 mg all other days   Weekly total 36 mg   Plan last modified Patricia Dinero RN (7/31/2017)   Next INR check 8/7/2017   Target end date     Indications   Atrial fibrillation (H) (Resolved) [I48.91]  Atrial fibrillation with RVR (H) [I48.91]         Anticoagulation Episode Summary     INR check location     Preferred lab     Send INR reminders to [facility]    Comments             See the Encounter Report to view Anticoagulation Flowsheet and Dosing Calendar (Go to Encounters tab in chart review, and find the Anticoagulation Therapy Visit)    Dosage adjustment made based on physician directed care plan.    Patricia Dinero RN

## 2017-07-31 NOTE — MR AVS SNAPSHOT
Juju Vaughan   7/31/2017 11:00 AM   Anticoagulation Therapy Visit    Description:  71 year old female   Provider:   ANTICOAGULATION CLINIC   Department:   Anti Coagulation           INR as of 7/31/2017     Today's INR 2.7      Anticoagulation Summary as of 7/31/2017     INR goal 2.0-3.0   Today's INR 2.7   Full instructions 7/31: 8 mg; 8/1: 6 mg; 8/2: 6 mg; 8/3: 6 mg; 8/5: 6 mg; 8/6: 6 mg; Otherwise 8 mg on Mon, Fri; 4 mg all other days   Next INR check 8/7/2017    Indications   Atrial fibrillation (H) (Resolved) [I48.91]  Atrial fibrillation with RVR (H) [I48.91]         Your next Anticoagulation Clinic appointment(s)     Aug 07, 2017 12:00 PM CDT   Anticoagulation Visit with  ANTICOAGULATION CLINIC   [facility]    [address] [address] [phone]              Contact Numbers     [facility]  Please call  [phone] to cancel and/or reschedule your appointment   Please call  [phone] with any problems or questions regarding your therapy.        July 2017 Details    Sun Mon Tue Wed Thu Fri Sat           1                 2               3               4               5               6               7               8                 9               10               11               12               13               14               15                 16               17               18               19               20               21               22                 23               24               25               26               27               28               29                 30               31      8 mg   See details            Date Details   07/31 This INR check               How to take your warfarin dose     To take:  8 mg Take 2 of the 4 mg tablets.           August 2017 Details    Sun Mon Tue Wed Thu Fri Sat       1      6 mg         2      6 mg         3      6 mg         4      8  mg         5      6 mg           6      6 mg         7            8               9               10               11               12                 13               14               15               16               17               18               19                 20               21               22               23               24               25               26                 27               28               29               30               31                  Date Details   No additional details    Date of next INR:  8/7/2017         How to take your warfarin dose     To take:  6 mg Take 1 of the 4 mg tablets and 1 of the 2 mg tablets.    To take:  8 mg Take 2 of the 4 mg tablets.

## 2017-08-07 ENCOUNTER — ANTICOAGULATION THERAPY VISIT (OUTPATIENT)
Dept: ANTICOAGULATION | Facility: CLINIC | Age: 72
End: 2017-08-07
Payer: MEDICARE

## 2017-08-07 LAB — INR POINT OF CARE: 2.7 (ref 0.86–1.14)

## 2017-08-07 PROCEDURE — 85610 PROTHROMBIN TIME: CPT | Mod: QW

## 2017-08-07 PROCEDURE — 36416 COLLJ CAPILLARY BLOOD SPEC: CPT

## 2017-08-07 NOTE — PROGRESS NOTES
ANTICOAGULATION FOLLOW-UP CLINIC VISIT    Patient Name:  Juju Vaughan  Date:  8/7/2017  Contact Type:  Face to Face    SUBJECTIVE:        OBJECTIVE    INR Protime   Date Value Ref Range Status   08/07/2017 2.7 (A) 0.86 - 1.14 Final       ASSESSMENT / PLAN  INR assessment THER    Recheck INR In: 2 WEEKS    INR Location Clinic      Anticoagulation Summary as of 8/7/2017     INR goal 2.0-3.0   Today's INR 2.7   Maintenance plan 8 mg (4 mg x 2) on Mon, Fri; 4 mg (4 mg x 1) all other days   Full instructions 8 mg on Mon, Fri; 4 mg all other days   Weekly total 36 mg   No change documented Thao Emery, RN   Plan last modified Patricia Dinero RN (7/31/2017)   Next INR check 8/21/2017   Target end date     Indications   Atrial fibrillation (H) (Resolved) [I48.91]  Atrial fibrillation with RVR (H) [I48.91]         Anticoagulation Episode Summary     INR check location     Preferred lab     Send INR reminders to  [facility]    Comments             See the Encounter Report to view Anticoagulation Flowsheet and Dosing Calendar (Go to Encounters tab in chart review, and find the Anticoagulation Therapy Visit)        Thao Emery, RN

## 2017-08-07 NOTE — MR AVS SNAPSHOT
Juju Vaughan   8/7/2017 12:00 PM   Anticoagulation Therapy Visit    Description:  71 year old female   Provider:   ANTICOAGULATION CLINIC   Department:   Anti Coagulation           INR as of 8/7/2017     Today's INR 2.7      Anticoagulation Summary as of 8/7/2017     INR goal 2.0-3.0   Today's INR 2.7   Full instructions 8 mg on Mon, Fri; 6 mg all other days   Next INR check 8/21/2017    Indications   Atrial fibrillation (H) (Resolved) [I48.91]  Atrial fibrillation with RVR (H) [I48.91]         Your next Anticoagulation Clinic appointment(s)     Aug 21, 2017 12:00 PM CDT   Anticoagulation Visit with  ANTICOAGULATION CLINIC   [facility] ([facility])    [address] [address] [phone]              Contact Numbers     [facility]  Please call  [phone] to cancel and/or reschedule your appointment   Please call  [phone] with any problems or questions regarding your therapy.        August 2017 Details    Sun Mon Tue Wed Thu Fri Sat       1               2               3               4               5                 6               7      8 mg   See details      8      6 mg         9      6 mg         10      6 mg         11      8 mg         12      6 mg           13      6 mg         14      8 mg         15      6 mg         16      6 mg         17      6 mg         18      8 mg         19      6 mg           20      6 mg         21            22               23               24               25               26                 27               28               29               30               31                  Date Details   08/07 This INR check       Date of next INR:  8/21/2017         How to take your warfarin dose     To take:  6 mg Take 1 of the 4 mg tablets and 1 of the 2 mg tablets.    To take:  8 mg Take 2 of the 4 mg tablets.

## 2017-08-21 ENCOUNTER — ANTICOAGULATION THERAPY VISIT (OUTPATIENT)
Dept: ANTICOAGULATION | Facility: CLINIC | Age: 72
End: 2017-08-21
Payer: MEDICARE

## 2017-08-21 DIAGNOSIS — I48.91 ATRIAL FIBRILLATION WITH RVR (H): ICD-10-CM

## 2017-08-21 LAB — INR POINT OF CARE: 2 (ref 0.86–1.14)

## 2017-08-21 PROCEDURE — 36416 COLLJ CAPILLARY BLOOD SPEC: CPT

## 2017-08-21 PROCEDURE — 85610 PROTHROMBIN TIME: CPT | Mod: QW

## 2017-08-21 PROCEDURE — 99207 ZZC NO CHARGE NURSE ONLY: CPT

## 2017-08-21 NOTE — PROGRESS NOTES
ANTICOAGULATION FOLLOW-UP CLINIC VISIT    Patient Name:  Juju Vaughan  Date:  8/21/2017  Contact Type:  Face to Face    SUBJECTIVE:     Patient Findings     Positives No Problem Findings           OBJECTIVE    INR Protime   Date Value Ref Range Status   08/21/2017 2.0 (A) 0.86 - 1.14 Final       ASSESSMENT / PLAN  INR assessment THER    Recheck INR In: 2 WEEKS    INR Location Clinic      Anticoagulation Summary as of 8/21/2017     INR goal 2.0-3.0   Today's INR 2.0   Maintenance plan 8 mg (4 mg x 2) on Mon, Fri; 6 mg (4 mg x 1 and 2 mg x 1) all other days   Full instructions 8 mg on Mon, Fri; 6 mg all other days   Weekly total 46 mg   Plan last modified Thao Emery RN (8/7/2017)   Next INR check 9/7/2017   Target end date     Indications   Atrial fibrillation (H) (Resolved) [I48.91]  Atrial fibrillation with RVR (H) [I48.91]         Anticoagulation Episode Summary     INR check location     Preferred lab     Send INR reminders to  [facility]    Comments             See the Encounter Report to view Anticoagulation Flowsheet and Dosing Calendar (Go to Encounters tab in chart review, and find the Anticoagulation Therapy Visit)    Dosage adjustment made based on physician directed care plan.    Patricia Dinero RN

## 2017-08-21 NOTE — MR AVS SNAPSHOT
Juju Vaughan   8/21/2017 12:00 PM   Anticoagulation Therapy Visit    Description:  71 year old female   Provider:   ANTICOAGULATION CLINIC   Department:   Anti Coagulation           INR as of 8/21/2017     Today's INR 2.0      Anticoagulation Summary as of 8/21/2017     INR goal 2.0-3.0   Today's INR 2.0   Full instructions 8 mg on Mon, Fri; 6 mg all other days   Next INR check 9/7/2017    Indications   Atrial fibrillation (H) (Resolved) [I48.91]  Atrial fibrillation with RVR (H) [I48.91]         Your next Anticoagulation Clinic appointment(s)     Sep 07, 2017 11:45 AM CDT   Anticoagulation Visit with  ANTICOAGULATION CLINIC   [facility]    [address] [place] [address] [phone]              Contact Numbers     [facility]  Please call  [phone] to cancel and/or reschedule your appointment   Please call  [phone] with any problems or questions regarding your therapy.        August 2017 Details    Sun Mon Tue Wed Thu Fri Sat       1               2               3               4               5                 6               7               8               9               10               11               12                 13               14               15               16               17               18               19                 20               21      8 mg   See details      22      6 mg         23      6 mg         24      6 mg         25      8 mg         26      6 mg           27      6 mg         28      8 mg         29      6 mg         30      6 mg         31      6 mg            Date Details   08/21 This INR check               How to take your warfarin dose     To take:  6 mg Take 1 of the 4 mg tablets and 1 of the 2 mg tablets.    To take:  8 mg Take 2 of the 4 mg tablets.           September 2017 Details    Sun Mon Tue Wed Thu Fri Sat          1      8 mg         2      6 mg            3      6 mg         4      8 mg         5      6 mg         6      6 mg         7            8               9                 10               11               12               13               14               15               16                 17               18               19               20               21               22               23                 24               25               26               27               28               29               30                Date Details   No additional details    Date of next INR:  9/7/2017         How to take your warfarin dose     To take:  6 mg Take 1 of the 4 mg tablets and 1 of the 2 mg tablets.    To take:  8 mg Take 2 of the 4 mg tablets.

## 2017-09-08 ENCOUNTER — ANTICOAGULATION THERAPY VISIT (OUTPATIENT)
Dept: ANTICOAGULATION | Facility: CLINIC | Age: 72
End: 2017-09-08
Payer: MEDICARE

## 2017-09-08 LAB — INR POINT OF CARE: 3.6 (ref 0.86–1.14)

## 2017-09-08 PROCEDURE — 85610 PROTHROMBIN TIME: CPT | Mod: QW

## 2017-09-08 PROCEDURE — 36416 COLLJ CAPILLARY BLOOD SPEC: CPT

## 2017-09-08 NOTE — PROGRESS NOTES
ANTICOAGULATION FOLLOW-UP CLINIC VISIT    Patient Name:  Juju Vaughan  Date:  9/8/2017  Contact Type:  Face to Face    SUBJECTIVE:     Patient Findings     Positives Other complaints (pt has been having having increase stress.), No Problem Findings           OBJECTIVE    INR Protime   Date Value Ref Range Status   09/08/2017 3.6 (A) 0.86 - 1.14 Final       ASSESSMENT / PLAN  INR assessment SUPRA    Recheck INR In: 1 WEEK    INR Location Clinic      Anticoagulation Summary as of 9/8/2017     INR goal 2.0-3.0   Today's INR 3.6!   Maintenance plan 6 mg (4 mg x 1 and 2 mg x 1) every day   Full instructions 9/8: 2 mg; Otherwise 6 mg every day   Weekly total 42 mg   Plan last modified Thao Emery RN (9/8/2017)   Next INR check 9/15/2017   Target end date     Indications   Atrial fibrillation (H) (Resolved) [I48.91]  Atrial fibrillation with RVR (H) [I48.91]         Anticoagulation Episode Summary     INR check location     Preferred lab     Send INR reminders to  [facility]    Comments             See the Encounter Report to view Anticoagulation Flowsheet and Dosing Calendar (Go to Encounters tab in chart review, and find the Anticoagulation Therapy Visit)        Thao Emery, RN

## 2017-09-08 NOTE — MR AVS SNAPSHOT
Juju Vaughan   9/8/2017 11:45 AM   Anticoagulation Therapy Visit    Description:  72 year old female   Provider:   ANTICOAGULATION CLINIC   Department:   Anti Coagulation           INR as of 9/8/2017     Today's INR 3.6!      Anticoagulation Summary as of 9/8/2017     INR goal 2.0-3.0   Today's INR 3.6!   Full instructions 9/8: 2 mg; Otherwise 6 mg every day   Next INR check 9/15/2017    Indications   Atrial fibrillation (H) (Resolved) [I48.91]  Atrial fibrillation with RVR (H) [I48.91]         Your next Anticoagulation Clinic appointment(s)     Sep 15, 2017  9:00 AM CDT   Anticoagulation Visit with  ANTICOAGULATION CLINIC   [facility] ([facility])    [address] [place] [address] [phone]              Contact Numbers     [facility]  Please call  [phone] to cancel and/or reschedule your appointment   Please call  [phone] with any problems or questions regarding your therapy.        September 2017 Details    Sun Mon Tue Wed Thu Fri Sat          1               2                 3               4               5               6               7               8      2 mg   See details      9      6 mg           10      6 mg         11      6 mg         12      6 mg         13      6 mg         14      6 mg         15            16                 17               18               19               20               21               22               23                 24               25               26               27               28               29               30                Date Details   09/08 This INR check       Date of next INR:  9/15/2017         How to take your warfarin dose     To take:  2 mg Take 1 of the 2 mg tablets.    To take:  6 mg Take 1 of the 4 mg tablets and 1 of the 2 mg tablets.

## 2017-09-18 ENCOUNTER — ANTICOAGULATION THERAPY VISIT (OUTPATIENT)
Dept: ANTICOAGULATION | Facility: CLINIC | Age: 72
End: 2017-09-18
Payer: MEDICARE

## 2017-09-18 DIAGNOSIS — I48.91 ATRIAL FIBRILLATION WITH RVR (H): ICD-10-CM

## 2017-09-18 LAB — INR POINT OF CARE: 1.4 (ref 0.86–1.14)

## 2017-09-18 PROCEDURE — 85610 PROTHROMBIN TIME: CPT | Mod: QW

## 2017-09-18 PROCEDURE — 99207 ZZC NO CHARGE NURSE ONLY: CPT

## 2017-09-18 PROCEDURE — 36416 COLLJ CAPILLARY BLOOD SPEC: CPT

## 2017-09-18 NOTE — PROGRESS NOTES
ANTICOAGULATION FOLLOW-UP CLINIC VISIT    Patient Name:  Juju Vaughan  Date:  9/18/2017  Contact Type:  Face to Face    SUBJECTIVE:     Patient Findings     Positives Change in diet/appetite (pt has been eating a large amount of greens ), Missed doses           OBJECTIVE    INR Protime   Date Value Ref Range Status   09/18/2017 1.4 (A) 0.86 - 1.14 Final       ASSESSMENT / PLAN  INR assessment THER    Recheck INR In: 1 WEEK    INR Location Clinic      Anticoagulation Summary as of 9/18/2017     INR goal 2.0-3.0   Today's INR 1.4!   Maintenance plan 6 mg (4 mg x 1 and 2 mg x 1) every day   Full instructions 9/18: 8 mg; Otherwise 6 mg every day   Weekly total 42 mg   Plan last modified Thao Emery RN (9/8/2017)   Next INR check 9/25/2017   Target end date     Indications   Atrial fibrillation (H) (Resolved) [I48.91]  Atrial fibrillation with RVR (H) [I48.91]         Anticoagulation Episode Summary     INR check location     Preferred lab     Send INR reminders to  [facility]    Comments             See the Encounter Report to view Anticoagulation Flowsheet and Dosing Calendar (Go to Encounters tab in chart review, and find the Anticoagulation Therapy Visit)    Dosage adjustment made based on physician directed care plan.    Patricia Dinero, TATIANA

## 2017-09-18 NOTE — MR AVS SNAPSHOT
Juju Vaughan   9/18/2017 11:30 AM   Anticoagulation Therapy Visit    Description:  72 year old female   Provider:   ANTICOAGULATION CLINIC   Department:   Anti Coagulation           INR as of 9/18/2017     Today's INR 1.4!      Anticoagulation Summary as of 9/18/2017     INR goal 2.0-3.0   Today's INR 1.4!   Full instructions 9/18: 8 mg; Otherwise 6 mg every day   Next INR check 9/25/2017    Indications   Atrial fibrillation (H) (Resolved) [I48.91]  Atrial fibrillation with RVR (H) [I48.91]         Your next Anticoagulation Clinic appointment(s)     Sep 25, 2017 11:30 AM CDT   Anticoagulation Visit with  ANTICOAGULATION CLINIC   [facility]    [address] [place] [address] [phone]              Contact Numbers     [facility]  Please call  [phone] to cancel and/or reschedule your appointment   Please call  [phone] with any problems or questions regarding your therapy.        September 2017 Details    Sun Mon Tue Wed Thu Fri Sat          1               2                 3               4               5               6               7               8               9                 10               11               12               13               14               15               16                 17               18      8 mg   See details      19      6 mg         20      6 mg         21      6 mg         22      6 mg         23      6 mg           24      6 mg         25            26               27               28               29               30                Date Details   09/18 This INR check       Date of next INR:  9/25/2017         How to take your warfarin dose     To take:  6 mg Take 1 of the 4 mg tablets and 1 of the 2 mg tablets.    To take:  8 mg Take 2 of the 4 mg tablets.

## 2017-09-25 ENCOUNTER — ANTICOAGULATION THERAPY VISIT (OUTPATIENT)
Dept: ANTICOAGULATION | Facility: CLINIC | Age: 72
End: 2017-09-25
Payer: MEDICARE

## 2017-09-25 DIAGNOSIS — I48.91 ATRIAL FIBRILLATION WITH RVR (H): ICD-10-CM

## 2017-09-25 LAB — INR POINT OF CARE: 2 (ref 0.86–1.14)

## 2017-09-25 PROCEDURE — 99207 ZZC NO CHARGE NURSE ONLY: CPT

## 2017-09-25 PROCEDURE — 85610 PROTHROMBIN TIME: CPT | Mod: QW

## 2017-09-25 PROCEDURE — 36416 COLLJ CAPILLARY BLOOD SPEC: CPT

## 2017-09-25 NOTE — PROGRESS NOTES
ANTICOAGULATION FOLLOW-UP CLINIC VISIT    Patient Name:  Juju Vaughan  Date:  9/25/2017  Contact Type:  Face to Face    SUBJECTIVE:     Patient Findings     Positives No Problem Findings           OBJECTIVE    INR Protime   Date Value Ref Range Status   09/25/2017 2.0 (A) 0.86 - 1.14 Final       ASSESSMENT / PLAN  INR assessment THER    Recheck INR In: 2 WEEKS    INR Location Clinic      Anticoagulation Summary as of 9/25/2017     INR goal 2.0-3.0   Today's INR 2.0   Maintenance plan 8 mg (4 mg x 2) on Mon; 6 mg (4 mg x 1 and 2 mg x 1) all other days   Full instructions 8 mg on Mon; 6 mg all other days   Weekly total 44 mg   Plan last modified Patricia Dinero RN (9/25/2017)   Next INR check 10/11/2017   Target end date     Indications   Atrial fibrillation (H) (Resolved) [I48.91]  Atrial fibrillation with RVR (H) [I48.91]         Anticoagulation Episode Summary     INR check location     Preferred lab     Send INR reminders to  [facility]    Comments             See the Encounter Report to view Anticoagulation Flowsheet and Dosing Calendar (Go to Encounters tab in chart review, and find the Anticoagulation Therapy Visit)    Dosage adjustment made based on physician directed care plan.    Patricia Dinero RN

## 2017-09-25 NOTE — MR AVS SNAPSHOT
Juju Vaughan   9/25/2017 11:30 AM   Anticoagulation Therapy Visit    Description:  72 year old female   Provider:   ANTICOAGULATION CLINIC   Department:   Anti Coagulation           INR as of 9/25/2017     Today's INR 2.0      Anticoagulation Summary as of 9/25/2017     INR goal 2.0-3.0   Today's INR 2.0   Full instructions 8 mg on Mon; 6 mg all other days   Next INR check 10/11/2017    Indications   Atrial fibrillation (H) (Resolved) [I48.91]  Atrial fibrillation with RVR (H) [I48.91]         Your next Anticoagulation Clinic appointment(s)     Oct 11, 2017 10:00 AM CDT   Anticoagulation Visit with  ANTICOAGULATION CLINIC   [facility] ([facility]    [address] [address] [phone]              Contact Numbers     [facility]  Please call  [phone] to cancel and/or reschedule your appointment   Please call  [phone] with any problems or questions regarding your therapy.        September 2017 Details    Sun Mon Tue Wed Thu Fri Sat          1               2                 3               4               5               6               7               8               9                 10               11               12               13               14               15               16                 17               18               19               20               21               22               23                 24               25      8 mg   See details      26      6 mg         27      6 mg         28      6 mg         29      6 mg         30      6 mg          Date Details   09/25 This INR check               How to take your warfarin dose     To take:  6 mg Take 1 of the 4 mg tablets and 1 of the 2 mg tablets.    To take:  8 mg Take 2 of the 4 mg tablets.           October 2017 Details    Sun Mon Tue Wed Thu Fri Sat     1      6 mg         2      8 mg         3      6 mg         4      6 mg          5      6 mg         6      6 mg         7      6 mg           8      6 mg         9      8 mg         10      6 mg         11            12               13               14                 15               16               17               18               19               20               21                 22               23               24               25               26               27               28                 29               30               31                    Date Details   No additional details    Date of next INR:  10/11/2017         How to take your warfarin dose     To take:  6 mg Take 1 of the 4 mg tablets and 1 of the 2 mg tablets.    To take:  8 mg Take 2 of the 4 mg tablets.

## 2017-09-28 ENCOUNTER — OFFICE VISIT (OUTPATIENT)
Dept: OBGYN | Facility: CLINIC | Age: 72
End: 2017-09-28
Attending: OBSTETRICS & GYNECOLOGY
Payer: MEDICARE

## 2017-09-28 ENCOUNTER — RADIANT APPOINTMENT (OUTPATIENT)
Dept: MAMMOGRAPHY | Facility: CLINIC | Age: 72
End: 2017-09-28
Attending: OBSTETRICS & GYNECOLOGY
Payer: MEDICARE

## 2017-09-28 VITALS
WEIGHT: 241 LBS | SYSTOLIC BLOOD PRESSURE: 119 MMHG | DIASTOLIC BLOOD PRESSURE: 73 MMHG | HEIGHT: 69 IN | HEART RATE: 72 BPM | BODY MASS INDEX: 35.7 KG/M2

## 2017-09-28 DIAGNOSIS — Z85.42 HISTORY OF ENDOMETRIAL CANCER: Primary | ICD-10-CM

## 2017-09-28 PROCEDURE — G0202 SCR MAMMO BI INCL CAD: HCPCS

## 2017-09-28 RX ORDER — ASPIRIN 81 MG/1
81 TABLET ORAL
COMMUNITY
End: 2018-01-01

## 2017-09-28 NOTE — LETTER
"9/28/2017       RE: Juju Vaughan  [address]   [facility] [address]     Dear Colleague,    Thank you for referring your patient, Juju Vaughan, to the [facility] at [facility] Please see a copy of my visit note below.    [facility] CLINIC VISIT    Juju Vaughan is a 71 year old female who presents today for her endometrial cancer surveillance visit. In 9/2014 was diagnosed with grade1, stage 1A endometrial cancer and underwent Davinci hysterectomy, BSO. Since that time she has had normal surveillance. Last pap of vaginal cuff was 10/2016 and was normal. She denies pelvic pain, vaginal bleeding or discharge. No urinary sx or constipation. Previously had urinary frequency, but attributes this to her cardiac catheterization procedures as she has had no trouble with frequency in several months since her last procedure. Still having mild incontinence, but this has improved since she has lost some weight recently.     HCM: Mammogram 3/16- normal, will have today  Colonoscopy: 3/16- normal due 2021  DEXA: 6/14- normal due 2019    Past Medical History:   Diagnosis Date     A-fib (H)     Chronic anticoagulation and antiarrhythmic drugs; has required multiple conversions     Antiplatelet or antithrombotic long-term use      Arrhythmia      ASD (atrial septal defect)     repaired at age 40     Difficulty in walking(719.7)      Eating disorder Current    Food addiction per patient; Sees private therapist at [facility]     Hypothyroidism (acquired)     Due to \"hot nodule,\" saw endocrine at [place] and was given radiation therapy, which \"killed the whole thyroid.\" No reported malignancy. Now follows with Endocrine at [facility] and on levothyroxine.     Irregular heart beat      Obesity      Obstructive sleep apnea Current    Uses CPAP; sees specialist     Rh incompatibility      Shortness of breath      Type 2 diabetes mellitus without complication (H)      Walking " "troubles      Past Surgical History:   Procedure Laterality Date     BIOPSY       COLONOSCOPY N/A 3/28/2016    Procedure: COMBINED COLONOSCOPY, SINGLE OR MULTIPLE BIOPSY/POLYPECTOMY BY BIOPSY;  Surgeon: Angela Herbert MD;  Location: [facility]     CYSTOSCOPY N/A 9/24/2014    Procedure: CYSTOSCOPY;  Surgeon: Anamaria Sher MD;  Location: [facility]     DAVINCI HYSTERECTOMY TOTAL, BILATERAL SALPINGO-OOPHORECTOMY, COMBINED N/A 9/24/2014    Procedure: COMBINED DAVINCI HYSTERECTOMY TOTAL, SALPINGO-OOPHORECTOMY;  Surgeon: Anamaria Sher MD;  Location: [facility] OR     H ABLATION FOCAL AFIB       HAND SURGERY      Repair after broken hand from fall; metal kiko placed between radius and ulna     KNEE SURGERY       ORTHOPEDIC SURGERY       REPAIR ATRIAL SEPTAL DEFECT      Age 40     THORACIC SURGERY       TONSILLECTOMY       TUBAL LIGATION      After second vaginal delivery     VASCULAR SURGERY       Family History   Problem Relation Age of Onset     Breast Cancer No family hx of      Cancer - colorectal No family hx of      Anxiety Disorder No family hx of      DIABETES No family hx of      Hypertension No family hx of      OSTEOPOROSIS No family hx of      CEREBROVASCULAR DISEASE No family hx of      Obesity No family hx of      SHx: No tobacco, alcohol, drug use   ROS: 10 point ROS neg other than the symptoms noted above in the HPI.  .    Objective:  /73  Pulse 72  Ht 1.74 m (5' 8.5\")  Wt 109.3 kg (241 lb)  BMI 36.11 kg/m2   General: Well appearing, no acute distress.   HEENT: Normal thyroid, no supraclavicular LAD  Breast: Symmetrical. No fixed or suspicious masses palpable. No skin changes or nipple changes/retraction. No axillary nodes.   Resp: Normal effort on room air.  Abdomen: Soft, non-tender, non-distended  Extremities: No edema  : Normal appearing external genitalia. No pelvic LAD. Vagina atrophic. Vaginal cuff and vaginal walls without lesions, smooth to palpation without nodularity. Cervix surgically " absent. Uterus and adnexa surgically absent.     A/P:   Juju Vaughan is a 71 year old female who presents today for her endometrial cancer surveillance visit. She is doing well, no concerns.     Due for mammogram, this was previously ordered, but pt was concerned about her pacemaker interfering with the mammogram. Confirmed with mammogram tech that her pacemaker does not preclude her from getting a mammogram. She will get her mammogram today after this visit.     Follow up: in 6 months    Virginie Kaye    Appreciate note by Dr. Kaye. Patient has been seen and examined by me with the resident, agree with above note.     Ayde Terry MD        Ob/Gyn PGY-1  09/05/17 1:08 PM

## 2017-09-28 NOTE — MR AVS SNAPSHOT
After Visit Summary   9/28/2017    Juju Vaughan    MRN: [id number]           Patient Information     Date Of Birth          1945        Visit Information        Provider Department      9/28/2017 1:45 PM Ayde Terry MD [facility]        Today's Diagnoses     History of endometrial cancer    -  1       Follow-ups after your visit        Your next 10 appointments already scheduled     Oct 05, 2017  8:45 AM CDT   Antico[facility] Visit with [facility]   [facility]    [address] [place] [id number] [phone]            Oct 05, 2017  9:20 AM CDT   Office Visit with Michele Hernandez MD   [facility]    [address] [place] [address] [phone]           Bring a current list of meds and any records pertaining to this visit. For Physicals, please bring immunization records and any forms needing to be filled out. Please arrive 10 minutes early to complete paperwork.            Nov 07, 2017 12:00 PM CST   (Arrive by 11:45 AM)   RETURN ENDOCRINE with Loida Christiansen MD   [facility] ([facility])    [address] [place] [id number] [phone]              Who to contact     Please call your clinic at [phone] to:    Ask questions about your health    Make or cancel appointments    Discuss your medicines    Learn about your test results    Speak to your doctor   If you have compliments or concerns about an experience at your clinic, or if you wish to file a complaint, please contact [facility] Patient Relations at [phone] or email us at [email or web address]         Additional Information About Your Visit        MyChart Information     [employer] gives you secure  "access to your electronic health record. If you see a primary care provider, you can also send messages to your care team and make appointments. If you have questions, please call your primary care clinic.  If you do not have a primary care provider, please call [phone] and they will assist you.      [employer] is an electronic gateway that provides easy, online access to your medical records. With [employer], you can request a clinic appointment, read your test results, renew a prescription or communicate with your care team.     To access your existing account, please contact your [facility] or call [phone] for assistance.        Care EveryWhere ID     This is your Care EveryWhere ID. This could be used by other organizations to access your [place] medical records  [id number]        Your Vitals Were     Pulse Height BMI (Body Mass Index)             72 1.74 m (5' 8.5\") 36.11 kg/m2          Blood Pressure from Last 3 Encounters:   09/28/17 119/73   05/02/17 141/83   11/10/16 147/80    Weight from Last 3 Encounters:   09/28/17 109.3 kg (241 lb)   05/02/17 117.1 kg (258 lb 1.6 oz)   03/23/17 115.5 kg (254 lb 9.6 oz)              Today, you had the following     No orders found for display         Today's Medication Changes          These changes are accurate as of: 9/28/17 11:59 PM.  If you have any questions, ask your nurse or doctor.               Stop taking these medicines if you haven't already. Please contact your care team if you have questions.     ELIQUIS 5 MG tablet   Generic drug:  apixaban ANTICOAGULANT   Stopped by:  Ayde Terry MD                    Primary Care Provider Office Phone # Fax #    Michele Hernandez [phone]       [address] [place] [id number]        Equal Access to Services     GERSON MARES : Brionna Flores, waaxda luqadaha, qaybta kaalmahyacinth cummins, saadia amin. " So [place] -[phone].    ATENCIÓN: Si bing mancilla, tiene a barker disposición servicios gratuitos de asistencia lingüística. Fiordaliza mitchell [phone].    We comply with applicable federal civil rights laws and [place] laws. We do not discriminate on the basis of race, color, national origin, age, disability, sex, sexual orientation, or gender identity.            Thank you!     Thank you for choosing [facility]  for your care. Our goal is always to provide you with excellent care. Hearing back from our patients is one way we can continue to improve our services. Please take a few minutes to complete the written survey that you may receive in the mail after your visit with us. Thank you!             Your Updated Medication List - Protect others around you: Learn how to safely use, store and throw away your medicines at www.disposemymeds.org.          This list is accurate as of: 9/28/17 11:59 PM.  Always use your most recent med list.                   Brand Name Dispense Instructions for use Diagnosis    aspirin EC 81 MG EC tablet      Take 81 mg by mouth        blood glucose monitoring lancets     3 Box    Use to test blood sugar 3 times daily    Hyperglycemia       blood glucose monitoring meter device kit     1 kit    Use to test blood sugars 1-3 times daily or as directed.    Type 2 diabetes mellitus without complication, without long-term current use of insulin (H)       blood glucose monitoring test strip    JESUSITA CONTOUR NEXT    270 each    Test THREE TIMES PER DAY    Type 2 diabetes mellitus without complication, without long-term current use of insulin (H)       FUROSEMIDE PO      Take 20 mg by mouth 2 times daily        levalbuterol 45 MCG/ACT Inhaler    XOPENEX HFA     Inhale 2 puffs into the lungs every 4 hours as needed for shortness of breath / dyspnea or wheezing        levothyroxine 112 MCG tablet    SYNTHROID/LEVOTHROID    90 tablet    Take 1 tablet (112 mcg) by mouth daily     Hashimoto's thyroiditis       metFORMIN 500 MG 24 hr tablet    GLUCOPHAGE-XR    270 tablet    Take three tablets by mouth daily (1,500 mg)    Diabetes mellitus, type 2 (H)       TYLENOL 325 MG tablet   Generic drug:  acetaminophen      Take 325-650 mg by mouth every 6 hours as needed for mild pain Take 2000 mg daily        * warfarin 2 MG tablet    COUMADIN     Take 2 mg by mouth daily        * warfarin 4 MG tablet    COUMADIN    180 tablet    Take 2 tablets daily or as directed by [facility]    A-fib (H)       * Notice:  This list has 2 medication(s) that are the same as other medications prescribed for you. Read the directions carefully, and ask your doctor or other care provider to review them with you.

## 2017-09-28 NOTE — PROGRESS NOTES
"[facility] CLINIC VISIT    Juju Vaughan is a 71 year old female who presents today for her endometrial cancer surveillance visit. In 9/2014 was diagnosed with grade1, stage 1A endometrial cancer and underwent Davinci hysterectomy, BSO. Since that time she has had normal surveillance. Last pap of vaginal cuff was 10/2016 and was normal. She denies pelvic pain, vaginal bleeding or discharge. No urinary sx or constipation. Previously had urinary frequency, but attributes this to her cardiac catheterization procedures as she has had no trouble with frequency in several months since her last procedure. Still having mild incontinence, but this has improved since she has lost some weight recently.     HCM: Mammogram 3/16- normal, will have today  Colonoscopy: 3/16- normal due 2021  DEXA: 6/14- normal due 2019    Past Medical History:   Diagnosis Date     A-fib (H)     Chronic anticoagulation and antiarrhythmic drugs; has required multiple conversions     Antiplatelet or antithrombotic long-term use      Arrhythmia      ASD (atrial septal defect)     repaired at age 40     Difficulty in walking(719.7)      Eating disorder Current    Food addiction per patient; Sees private therapist at [facility]     Hypothyroidism (acquired)     Due to \"hot nodule,\" saw endocrine at [place] and was given radiation therapy, which \"killed the whole thyroid.\" No reported malignancy. Now follows with Endocrine at [facility] and on levothyroxine.     Irregular heart beat      Obesity      Obstructive sleep apnea Current    Uses CPAP; sees specialist     Rh incompatibility      Shortness of breath      Type 2 diabetes mellitus without complication (H)      Walking troubles      Past Surgical History:   Procedure Laterality Date     BIOPSY       COLONOSCOPY N/A 3/28/2016    Procedure: COMBINED COLONOSCOPY, SINGLE OR MULTIPLE BIOPSY/POLYPECTOMY BY BIOPSY;  Surgeon: Angela Herbert MD;  Location:  GI     CYSTOSCOPY N/A 9/24/2014    Procedure: CYSTOSCOPY;  " "Surgeon: Anamaria Sher MD;  Location: [facility]     DAVINCI HYSTERECTOMY TOTAL, BILATERAL SALPINGO-OOPHORECTOMY, COMBINED N/A 9/24/2014    Procedure: COMBINED DAVINCI HYSTERECTOMY TOTAL, SALPINGO-OOPHORECTOMY;  Surgeon: Anamaria Sher MD;  Location: [facility] OR     H ABLATION FOCAL AFIB       HAND SURGERY      Repair after broken hand from fall; metal kiko placed between radius and ulna     KNEE SURGERY       ORTHOPEDIC SURGERY       REPAIR ATRIAL SEPTAL DEFECT      Age 40     THORACIC SURGERY       TONSILLECTOMY       TUBAL LIGATION      After second vaginal delivery     VASCULAR SURGERY       Family History   Problem Relation Age of Onset     Breast Cancer No family hx of      Cancer - colorectal No family hx of      Anxiety Disorder No family hx of      DIABETES No family hx of      Hypertension No family hx of      OSTEOPOROSIS No family hx of      CEREBROVASCULAR DISEASE No family hx of      Obesity No family hx of      SHx: No tobacco, alcohol, drug use   ROS: 10 point ROS neg other than the symptoms noted above in the HPI.  .    Objective:  /73  Pulse 72  Ht 1.74 m (5' 8.5\")  Wt 109.3 kg (241 lb)  BMI 36.11 kg/m2   General: Well appearing, no acute distress.   HEENT: Normal thyroid, no supraclavicular LAD  Breast: Symmetrical. No fixed or suspicious masses palpable. No skin changes or nipple changes/retraction. No axillary nodes.   Resp: Normal effort on room air.  Abdomen: Soft, non-tender, non-distended  Extremities: No edema  : Normal appearing external genitalia. No pelvic LAD. Vagina atrophic. Vaginal cuff and vaginal walls without lesions, smooth to palpation without nodularity. Cervix surgically absent. Uterus and adnexa surgically absent.     A/P:   Juju Vaughan is a 71 year old female who presents today for her endometrial cancer surveillance visit. She is doing well, no concerns.     Due for mammogram, this was previously ordered, but pt was concerned about her pacemaker " interfering with the mammogram. Confirmed with mammogram tech that her pacemaker does not preclude her from getting a mammogram. She will get her mammogram today after this visit.     Follow up: in 6 months    Virginie Kaye    Appreciate note by Dr. Kaye. Patient has been seen and examined by me with the resident, agree with above note.     Ayde Terry MD        Ob/Gyn PGY-1  09/05/17 1:08 PM

## 2017-10-05 ENCOUNTER — OFFICE VISIT (OUTPATIENT)
Dept: INTERNAL MEDICINE | Facility: CLINIC | Age: 72
End: 2017-10-05
Payer: MEDICARE

## 2017-10-05 ENCOUNTER — ANTICOAGULATION THERAPY VISIT (OUTPATIENT)
Dept: ANTICOAGULATION | Facility: CLINIC | Age: 72
End: 2017-10-05
Payer: MEDICARE

## 2017-10-05 VITALS
HEART RATE: 77 BPM | SYSTOLIC BLOOD PRESSURE: 126 MMHG | BODY MASS INDEX: 35.8 KG/M2 | DIASTOLIC BLOOD PRESSURE: 74 MMHG | TEMPERATURE: 97.7 F | WEIGHT: 238.9 LBS | OXYGEN SATURATION: 97 %

## 2017-10-05 DIAGNOSIS — Z23 NEED FOR PROPHYLACTIC VACCINATION AND INOCULATION AGAINST INFLUENZA: ICD-10-CM

## 2017-10-05 DIAGNOSIS — E55.9 VITAMIN D DEFICIENCY: ICD-10-CM

## 2017-10-05 DIAGNOSIS — I50.9 CONGESTIVE HEART FAILURE, UNSPECIFIED CONGESTIVE HEART FAILURE CHRONICITY, UNSPECIFIED CONGESTIVE HEART FAILURE TYPE: ICD-10-CM

## 2017-10-05 DIAGNOSIS — E11.9 TYPE 2 DIABETES MELLITUS WITHOUT COMPLICATION, WITHOUT LONG-TERM CURRENT USE OF INSULIN (H): Primary | ICD-10-CM

## 2017-10-05 DIAGNOSIS — I48.91 ATRIAL FIBRILLATION WITH RVR (H): ICD-10-CM

## 2017-10-05 DIAGNOSIS — E06.3 HASHIMOTO'S THYROIDITIS: ICD-10-CM

## 2017-10-05 LAB
ALBUMIN SERPL-MCNC: 4 G/DL (ref 3.4–5)
ALP SERPL-CCNC: 63 U/L (ref 40–150)
ALT SERPL W P-5'-P-CCNC: 45 U/L (ref 0–50)
ANION GAP SERPL CALCULATED.3IONS-SCNC: 8 MMOL/L (ref 3–14)
AST SERPL W P-5'-P-CCNC: 28 U/L (ref 0–45)
BASOPHILS # BLD AUTO: 0 10E9/L (ref 0–0.2)
BASOPHILS NFR BLD AUTO: 0.3 %
BILIRUB SERPL-MCNC: 0.7 MG/DL (ref 0.2–1.3)
BUN SERPL-MCNC: 13 MG/DL (ref 7–30)
CALCIUM SERPL-MCNC: 9.1 MG/DL (ref 8.5–10.1)
CHLORIDE SERPL-SCNC: 106 MMOL/L (ref 94–109)
CHOLEST SERPL-MCNC: 142 MG/DL
CO2 SERPL-SCNC: 27 MMOL/L (ref 20–32)
CREAT SERPL-MCNC: 0.71 MG/DL (ref 0.52–1.04)
DIFFERENTIAL METHOD BLD: ABNORMAL
EOSINOPHIL # BLD AUTO: 0.1 10E9/L (ref 0–0.7)
EOSINOPHIL NFR BLD AUTO: 1.4 %
ERYTHROCYTE [DISTWIDTH] IN BLOOD BY AUTOMATED COUNT: 15.7 % (ref 10–15)
GFR SERPL CREATININE-BSD FRML MDRD: 81 ML/MIN/1.7M2
GLUCOSE SERPL-MCNC: 108 MG/DL (ref 70–99)
HBA1C MFR BLD: 6.2 % (ref 4.3–6)
HCT VFR BLD AUTO: 44.6 % (ref 35–47)
HDLC SERPL-MCNC: 52 MG/DL
HGB BLD-MCNC: 13.9 G/DL (ref 11.7–15.7)
INR POINT OF CARE: 1.6 (ref 0.86–1.14)
LDLC SERPL CALC-MCNC: 75 MG/DL
LYMPHOCYTES # BLD AUTO: 2 10E9/L (ref 0.8–5.3)
LYMPHOCYTES NFR BLD AUTO: 30.8 %
MAGNESIUM SERPL-MCNC: 2.3 MG/DL (ref 1.6–2.3)
MCH RBC QN AUTO: 28.5 PG (ref 26.5–33)
MCHC RBC AUTO-ENTMCNC: 31.2 G/DL (ref 31.5–36.5)
MCV RBC AUTO: 92 FL (ref 78–100)
MONOCYTES # BLD AUTO: 0.4 10E9/L (ref 0–1.3)
MONOCYTES NFR BLD AUTO: 5.9 %
NEUTROPHILS # BLD AUTO: 4 10E9/L (ref 1.6–8.3)
NEUTROPHILS NFR BLD AUTO: 61.6 %
NONHDLC SERPL-MCNC: 90 MG/DL
PLATELET # BLD AUTO: 157 10E9/L (ref 150–450)
POTASSIUM SERPL-SCNC: 4.2 MMOL/L (ref 3.4–5.3)
PROT SERPL-MCNC: 7.7 G/DL (ref 6.8–8.8)
RBC # BLD AUTO: 4.87 10E12/L (ref 3.8–5.2)
SODIUM SERPL-SCNC: 141 MMOL/L (ref 133–144)
TRIGL SERPL-MCNC: 77 MG/DL
TSH SERPL DL<=0.005 MIU/L-ACNC: 2.69 MU/L (ref 0.4–4)
WBC # BLD AUTO: 6.5 10E9/L (ref 4–11)

## 2017-10-05 PROCEDURE — 83735 ASSAY OF MAGNESIUM: CPT | Performed by: INTERNAL MEDICINE

## 2017-10-05 PROCEDURE — 83036 HEMOGLOBIN GLYCOSYLATED A1C: CPT | Performed by: INTERNAL MEDICINE

## 2017-10-05 PROCEDURE — 82306 VITAMIN D 25 HYDROXY: CPT | Performed by: INTERNAL MEDICINE

## 2017-10-05 PROCEDURE — 80061 LIPID PANEL: CPT | Performed by: INTERNAL MEDICINE

## 2017-10-05 PROCEDURE — G0008 ADMIN INFLUENZA VIRUS VAC: HCPCS | Performed by: INTERNAL MEDICINE

## 2017-10-05 PROCEDURE — 90662 IIV NO PRSV INCREASED AG IM: CPT | Performed by: INTERNAL MEDICINE

## 2017-10-05 PROCEDURE — 99214 OFFICE O/P EST MOD 30 MIN: CPT | Mod: 25 | Performed by: INTERNAL MEDICINE

## 2017-10-05 PROCEDURE — 36416 COLLJ CAPILLARY BLOOD SPEC: CPT

## 2017-10-05 PROCEDURE — 80050 GENERAL HEALTH PANEL: CPT | Performed by: INTERNAL MEDICINE

## 2017-10-05 PROCEDURE — 85610 PROTHROMBIN TIME: CPT | Mod: QW

## 2017-10-05 PROCEDURE — 99207 ZZC NO CHARGE NURSE ONLY: CPT

## 2017-10-05 NOTE — PROGRESS NOTES
Injectable Influenza Immunization Documentation    1.  Is the person to be vaccinated sick today?   No    2. Does the person to be vaccinated have an allergy to a component   of the vaccine?   No    3. Has the person to be vaccinated ever had a serious reaction   to influenza vaccine in the past?   No    4. Has the person to be vaccinated ever had Guillain-Barré syndrome?   No    Form completed by Chantale Irving CMA

## 2017-10-05 NOTE — MR AVS SNAPSHOT
Juju Vaughan   10/5/2017 8:45 AM   Anticoagulation Therapy Visit    Description:  72 year old female   Provider:   ANTICOAGULATION CLINIC   Department:   Anti Coagulation           INR as of 10/5/2017     Today's INR 1.6!      Anticoagulation Summary as of 10/5/2017     INR goal 2.0-3.0   Today's INR 1.6!   Full instructions 10/5: 8 mg; Otherwise 8 mg on Mon; 6 mg all other days   Next INR check 10/13/2017    Indications   Atrial fibrillation (H) (Resolved) [I48.91]  Atrial fibrillation with RVR (H) [I48.91]         Description     Stopped asa       Your next Anticoagulation Clinic appointment(s)     Oct 13, 2017 11:30 AM CDT   An[facility]agulation [facility]  [facility]    [address] [address] [phone]              Contact Numbers     [facility]  Please call  [phone] to cancel and/or reschedule your appointment   Please call  [phone] with any problems or questions regarding your therapy.        October 2017 Details    Sun Mon Tue Wed Thu Fri Sat     1               2               3               4               5      8 mg   See details      6      6 mg         7      6 mg           8      6 mg         9      8 mg         10      6 mg         11      6 mg         12      6 mg         13            14                 15               16               17               18               19               20               21                 22               23               24               25               26               27               28                 29               30               31                    Date Details   10/05 This INR check       Date of next INR:  10/13/2017         How to take your warfarin dose     To take:  6 mg Take 1 of the 4 mg tablets and 1 of the 2 mg tablets.    To take:  8 mg Take 2 of the 4 mg tablets.

## 2017-10-05 NOTE — PATIENT INSTRUCTIONS
"*  Continue all medications at the same doses.  Contact your usual pharmacy if you need refills.     *  continue your diet changes.     *  Return to see me in approximately 6 months, sooner if needed.  Please get nonfasting labs done in the [facility] any other [facility] lab 1-2 days before this appointment.  If you get the labs done at another clinic, make arrangements with that clinic directly.  The orders will be in place.  Call [phone] to schedule appointments at [facility].     5 GOALS IN MANAGING DIABETES (i.e. to give the best chance to prevent diabetic complications and vascular disease):     1.  Aggressive blood pressure control, under 130/80 ideally.  Using medications if needed    Your blood pressure is under good control    BP Readings from Last 4 Encounters:   10/05/17 126/74   09/28/17 119/73   05/02/17 141/83   11/10/16 147/80       2.  Aggressive LDL cholesterol (bad cholesterol) lowering as needed.  Your goal is an LDL under 100 for sure, preferably under 70.     *  All patients with diabetes are recommended to be on a \"statin\" cholesterol lowering medication regardless of the cholesterol levels to give the best chance at avoiding vascular disease.      New guidelines identify four high-risk groups who could benefit from statins:   *people with pre-existing heart disease, such as those who have had a heart attack;   *people ages 40 to 75 who have diabetes of any type  *patients ages 40 to 75 with at least a 7.5% risk of developing cardiovascular disease over the next decade, according to a formula described in the guidelines  *patients with the sort of super-high cholesterol that sometimes runs in families, as evidenced by an LDL of 190 milligrams per deciliter or higher    *  Your cholesterol levels are well controlled.    Recent Labs   Lab Test  05/02/16   1059  10/09/14   1004  02/07/11   1213   CHOL  108  121  167   HDL  37*  36*  44*   LDL  54  67  98   TRIG  84  92  124 " "  CHOLHDLRATIO   --   3.4  4.0       3.  Aggressive diabetic management.  The target for A1C (3 months average blood sugar) is ideally below 6.5, preferably below 7.5    Your diabetes is under good control.      Lab Results   Component Value Date    A1C 6.3 01/30/2017    A1C 6.5 09/14/2016    A1C 6.7 05/02/2016    A1C 7.0 12/27/2014    A1C 6.4 04/22/2014       4.  No smoking    5.  Aspirin tablet once per day, every day unless there is a specific reason that you cannot take aspirin.       *You should take Aspirin 81 mg once per day.         .       --Good Grains:  Oats, brown rice, Quinoa (these do not raise the blood sugar as much)     --Bad grains:  Anything made from wheat or white rice     (because these raise the blood sugars significantly, and the possible gluten issue from wheat for some people).      --Proteins:  Aim for \"lean proteins\" including chicken, fish, seafood, pork, turkey, and eggs (in moderation); Eat red meat only occasionally          Jamin Meraz:                             "

## 2017-10-05 NOTE — PROGRESS NOTES
SUBJECTIVE:   Juju Vaughan is a 72 year old female who presents to clinic today for the following health issues:    Is requesting testing frequency for TID    Diabetes Follow-up  Patient is checking blood sugars: twice daily.    Blood sugar testing frequency justification: Frequent hypoglycemia and Patient modifying lifestyle changes (diet, exercise) with blood sugars  Results are as follows:       am - 125-140            postprandial after lunch- 100-110    Diabetic concerns: None     Symptoms of hypoglycemia (low blood sugar): none     Paresthesias (numbness or burning in feet) or sores: No   Date of last diabetic eye exam: 08/2017    In regards specifically to the patient's diabetes, they reports no unusual symptoms.    No significnat or regular episodes of hypo or hyperglycemia    Medication compliance: compliant most of the time  Diabetic diet compliance: compliant most of the time  Diabetic ROS: no polyuria or polydipsia, no chest pain, dyspnea or TIA's, no numbness, tingling or pain in extremities    Home blood sugar monitoring: are performed regulary    Review of patients self blood glucose monitoring shows fasting always < 130 and post prandial average under 170    Diabetic complications: none     Most  recent labs:    Lab Results   Component Value Date    A1C 6.3 01/30/2017    A1C 6.5 09/14/2016    A1C 6.7 05/02/2016    A1C 7.0 12/27/2014    A1C 6.4 04/22/2014            Hypothyroidism Follow-up    Since last visit, patient describes the following symptoms: Weight stable, no hair loss, no skin changes, no constipation, no loose stools      Amount of exercise or physical activity: None    Problems taking medications regularly: No    Medication side effects: none  Diet: diabetic      3.  Prior of coronary artery disease as listed in medical history.  No current or recent cardiovascaulr symptoms.   No shortness of breath, no episodes of chest pain/pressure, no dyspnea on exertion, no changes in his  "abiliy to perform physical exertion or tasks.  Takes the same amount of time to perform similar physical tasks.        Problem list and histories reviewed & adjusted, as indicated.  Additional history: as documented        Reviewed and updated as needed this visit by clinical staff       Reviewed and updated as needed this visit by Provider             Past Medical History:  ---------------------------  Past Medical History:   Diagnosis Date     A-fib (H)     Chronic anticoagulation and antiarrhythmic drugs; has required multiple conversions     Antiplatelet or antithrombotic long-term use      Arrhythmia      ASD (atrial septal defect)     repaired at age 40     Difficulty in walking(719.7)      Eating disorder Current    Food addiction per patient; Sees private therapist at [facility]     Hypothyroidism (acquired)     Due to \"hot nodule,\" saw endocrine at [place] and was given radiation therapy, which \"killed the whole thyroid.\" No reported malignancy. Now follows with Endocrine at [facility] and on levothyroxine.     Irregular heart beat      Obesity      Obstructive sleep apnea Current    Uses CPAP; sees specialist     Rh incompatibility      Shortness of breath      Type 2 diabetes mellitus without complication (H)      Walking troubles        Past Surgical History:  ---------------------------  Past Surgical History:   Procedure Laterality Date     BIOPSY       COLONOSCOPY N/A 3/28/2016    Procedure: COMBINED COLONOSCOPY, SINGLE OR MULTIPLE BIOPSY/POLYPECTOMY BY BIOPSY;  Surgeon: Angela Herbert MD;  Location:  GI     CYSTOSCOPY N/A 9/24/2014    Procedure: CYSTOSCOPY;  Surgeon: Anamaria Sher MD;  Location: [facility] OR     DAVINCI HYSTERECTOMY TOTAL, BILATERAL SALPINGO-OOPHORECTOMY, COMBINED N/A 9/24/2014    Procedure: COMBINED DAVINCI HYSTERECTOMY TOTAL, SALPINGO-OOPHORECTOMY;  Surgeon: Anamaria Sher MD;  Location: [facility] OR     H ABLATION FOCAL AFIB       HAND SURGERY      Repair after broken hand from fall; metal " kiko placed between radius and ulna     KNEE SURGERY       ORTHOPEDIC SURGERY       REPAIR ATRIAL SEPTAL DEFECT      Age 40     THORACIC SURGERY       TONSILLECTOMY       TUBAL LIGATION      After second vaginal delivery     VASCULAR SURGERY         Current Medications:  ---------------------------  Current Outpatient Prescriptions   Medication Sig Dispense Refill     MELATONIN PO Take 5 mg by mouth       aspirin EC 81 MG EC tablet Take 81 mg by mouth       warfarin (COUMADIN) 4 MG tablet Take 2 tablets daily or as directed by [facility] 180 tablet 0     warfarin (COUMADIN) 2 MG tablet Take 2 mg by mouth daily       blood glucose monitoring (JESUSITA CONTOUR NEXT) test strip Test THREE TIMES PER  each 3     blood glucose monitoring (JESUSITA MICROLET) lancets Use to test blood sugar 3 times daily 3 Box 3     levothyroxine (SYNTHROID/LEVOTHROID) 112 MCG tablet Take 1 tablet (112 mcg) by mouth daily 90 tablet 3     blood glucose monitoring (JESUSITA CONTOUR MONITOR) meter device kit Use to test blood sugars 1-3 times daily or as directed. 1 kit 0     metFORMIN (GLUCOPHAGE-XR) 500 MG 24 hr tablet Take three tablets by mouth daily (1,500 mg) (Patient not taking: Reported on 10/5/2017) 270 tablet 3     levalbuterol (XOPENEX HFA) 45 MCG/ACT inhaler Inhale 2 puffs into the lungs every 4 hours as needed for shortness of breath / dyspnea or wheezing       FUROSEMIDE PO Take 20 mg by mouth 2 times daily       acetaminophen (TYLENOL) 325 MG tablet Take 325-650 mg by mouth every 6 hours as needed for mild pain Take 2000 mg daily         Allergies:  -------------  Allergies   Allergen Reactions     Animal Dander      Contrast Dye Other (See Comments)     Family history of reaction.  Patient has not had reaction.       Flecainide Muscle Pain (Myalgia)     Iodine I 131 Tositumomab      Morphine Hcl Itching     Percocet [Oxycodone-Acetaminophen]      Perfume Difficulty breathing     Seafood      Dehydrated with high fever     Tape  [Adhesive Tape] Blisters     All tape, dermabond, except paper tape     Vicodin [Hydrocodone-Acetaminophen] Other (See Comments)     Flu-like symptoms     Walnuts [Nuts]        Social History:  -------------------  Social History     Social History     Marital status:      Spouse name: N/A     Number of children: N/A     Years of education: N/A     Occupational History     Not on file.     Social History Main Topics     Smoking status: Never Smoker     Smokeless tobacco: Never Used     Alcohol use No     Drug use: No     Sexual activity: Not Currently     Partners: Male     Birth control/ protection: Surgical      Comment: hysterectomy     Other Topics Concern     Not on file     Social History Narrative       Family Medical History:  ------------------------------  Family History   Problem Relation Age of Onset     Breast Cancer No family hx of      Cancer - colorectal No family hx of      Anxiety Disorder No family hx of      DIABETES No family hx of      Hypertension No family hx of      OSTEOPOROSIS No family hx of      CEREBROVASCULAR DISEASE No family hx of      Obesity No family hx of          ROS:  REVIEW OF SYSTEMS:    RESP: negative for cough, dyspnea, wheezing, hemoptysis  CV: negative for chest pain, palpitations, PND, RANGEL, orthopnea; reports no changes in their ability to perform physical activity (from cardiovascular standpoint)  GI: negative for dysphagia, N/V, pain, melena, diarrhea and constipation  NEURO: negative for numbness/tingling, paralysis, incoordination, or focal weakness     OBJECTIVE:                                                    /74  Pulse 77  Temp 97.7  F (36.5  C) (Oral)  Wt 238 lb 14.4 oz (108.4 kg)  SpO2 97%  BMI 35.8 kg/m2     GENERAL alert and no distress  EYES:  Normal sclera,conjunctiva, EOMI  HENT: oral and posterior pharynx without lesions or erythema, facies symmetric  NECK: Neck supple. No LAD, without thyroidmegaly or JVD., Carotids without  bruits.  RESP: Clear to ausculation bilaterally without wheezes or crackles. Normal BS in all fields.  CV: RRR normal S1S2 without murmurs, rubs or gallops. PMI normal  LYMPH: no cervical lymph adenopathy appreciated  MS: extremities- no gross deformities of the visible extremities noted, no edema  PSYCH: Alert and oriented times 3; speech- coherent  SKIN:  No obvious significant skin lesions on visible portions of face          ASSESSMENT/PLAN:                                                      (E11.9) Type 2 diabetes mellitus without complication, without long-term current use of insulin (H)  (primary encounter diagnosis)  Comment: Discussed importance in compliance in all areas of diabetic control including diet, routine BS checks, absolute medication compliance, laboratory monitoring, and attending regular follow up appointments as ordered.  Failure to comply with instructions regarding diabetes will lead to a greater chance of poor diabetic control and therefore a greater chance of diabetes related complications such as CAD, CVA, PVD, and retinopathy/neuropathy/nephropathy.  Based on level of diabetes control: testing frequency Frequent hypoglycemia and Patient modifying lifestyle changes (diet, exercise) with blood sugars  Plan: Hemoglobin A1c, Comprehensive metabolic panel         (BMP + Alb, Alk Phos, ALT, AST, Total. Bili,         TP), Lipid panel reflex to direct LDL, TSH with        free T4 reflex, CBC with platelets and         differential, Magnesium            (I50.9) Congestive heart failure, unspecified congestive heart failure chronicity, unspecified congestive heart failure type (H)  Comment: This condition is currently controlled on the current medical regimen.  Continue current therapy.   No signs and symptoms of CHF at this time.   Plan: Comprehensive metabolic panel (BMP + Alb, Alk         Phos, ALT, AST, Total. Bili, TP), CBC with         platelets and differential            (I48.91) Atrial  fibrillation with RVR (H)  Comment: This condition is currently controlled on the current medical regimen.  Continue current therapy.   Plan: Magnesium            (E55.9) Vitamin D deficiency  Comment:   Plan: Vitamin D Deficiency            (E06.3) Hashimoto's thyroiditis  Comment: Discussed signs and symptoms of hypo and hyperthyroidism.  Reviewed treatment options.   Recommended absolute medication compliance to avoid adding any additionial variance to the labs.   Plan: TSH with free T4 reflex            (Z23) Need for prophylactic vaccination and inoculation against influenza  Comment:   Plan: FLU VACCINE, INCREASED ANTIGEN, PRESV FREE, AGE        65+ [41320], ADMIN INFLUENZA (For MEDICARE         Patients ONLY) []               See Patient Instructions    VINNY DUMONT M.D., MD  [facility]

## 2017-10-05 NOTE — MR AVS SNAPSHOT
"              After Visit Summary   10/5/2017    Juju Vaughan    MRN: [id number]           Patient Information     Date Of Birth          1945        Visit Information        Provider Department      10/5/2017 9:20 AM Michele Hernandez MD [facility]        Today's Diagnoses     Type 2 diabetes mellitus without complication, without long-term current use of insulin (H)    -  1    Congestive heart failure, unspecified congestive heart failure chronicity, unspecified congestive heart failure type (H)        Atrial fibrillation with RVR (H)        Vitamin D deficiency        Hashimoto's thyroiditis          Care Instructions    *  Continue all medications at the same doses.  Contact your usual pharmacy if you need refills.     *  continue your diet changes.     *  Return to see me in approximately 6 months, sooner if needed.  Please get nonfasting labs done in the [facility] any other [facility] lab 1-2 days before this appointment.  If you get the labs done at another clinic, make arrangements with that clinic directly.  The orders will be in place.  Call [phone] to schedule appointments at [facility].     5 GOALS IN MANAGING DIABETES (i.e. to give the best chance to prevent diabetic complications and vascular disease):     1.  Aggressive blood pressure control, under 130/80 ideally.  Using medications if needed    Your blood pressure is under good control    BP Readings from Last 4 Encounters:   10/05/17 126/74   09/28/17 119/73   05/02/17 141/83   11/10/16 147/80       2.  Aggressive LDL cholesterol (bad cholesterol) lowering as needed.  Your goal is an LDL under 100 for sure, preferably under 70.     *  All patients with diabetes are recommended to be on a \"statin\" cholesterol lowering medication regardless of the cholesterol levels to give the best chance at avoiding vascular disease.      New guidelines identify four high-risk groups who could benefit from " "statins:   *people with pre-existing heart disease, such as those who have had a heart attack;   *people ages 40 to 75 who have diabetes of any type  *patients ages 40 to 75 with at least a 7.5% risk of developing cardiovascular disease over the next decade, according to a formula described in the guidelines  *patients with the sort of super-high cholesterol that sometimes runs in families, as evidenced by an LDL of 190 milligrams per deciliter or higher    *  Your cholesterol levels are well controlled.    Recent Labs   Lab Test  05/02/16   1059  10/09/14   1004  02/07/11   1213   CHOL  108  121  167   HDL  37*  36*  44*   LDL  54  67  98   TRIG  84  92  124   CHOLHDLRATIO   --   3.4  4.0       3.  Aggressive diabetic management.  The target for A1C (3 months average blood sugar) is ideally below 6.5, preferably below 7.5    Your diabetes is under good control.      Lab Results   Component Value Date    A1C 6.3 01/30/2017    A1C 6.5 09/14/2016    A1C 6.7 05/02/2016    A1C 7.0 12/27/2014    A1C 6.4 04/22/2014       4.  No smoking    5.  Aspirin tablet once per day, every day unless there is a specific reason that you cannot take aspirin.       *You should take Aspirin 81 mg once per day.         .       --Good Grains:  Oats, brown rice, Quinoa (these do not raise the blood sugar as much)     --Bad grains:  Anything made from wheat or white rice     (because these raise the blood sugars significantly, and the possible gluten issue from wheat for some people).      --Proteins:  Aim for \"lean proteins\" including chicken, fish, seafood, pork, turkey, and eggs (in moderation); Eat red meat only occasionally          Jamin Meraz:                                     Follow-ups after your visit        Your next 10 appointments already scheduled     Oct 13, 2017 11:30 AM CDT   Anticoagulation Visit with  [facility]AGULATION CLINIC   [facility]    [address] " [facility] [address] [phone]            Nov 07, 2017 12:00 PM CST   (Arrive by 11:45 AM)   RETURN ENDOCRINE with Loida Christiansen MD   [facility] ([facility])    [address]  [address]  [facility] [address] [phone]              Who to contact     If you have questions or need follow up information about today's clinic visit or your schedule please contact [facility] directly at [phone].  Normal or non-critical lab and imaging results will be communicated to you by [employer], letter or phone within 4 business days after the clinic has received the results. If you do not hear from us within 7 days, please contact the clinic through [employer] or phone. If you have a critical or abnormal lab result, we will notify you by phone as soon as possible.  Submit refill requests through [employer] or call your pharmacy and they will forward the refill request to us. Please allow 3 business days for your refill to be completed.          Additional Information About Your Visit        MyChart Information     [employer] gives you secure access to your electronic health record. If you see a primary care provider, you can also send messages to your care team and make appointments. If you have questions, please call your primary care clinic.  If you do not have a primary care provider, please call [phone] and they will assist you.        Care EveryWhere ID     This is your Care EveryWhere ID. This could be used by other organizations to access your [place] medical records  [id number]        Your Vitals Were     Pulse Temperature Pulse Oximetry BMI (Body Mass Index)          77 97.7  F (36.5  C) (Oral) 97% 35.8 kg/m2         Blood Pressure from Last 3 Encounters:   10/05/17 126/74   09/28/17 119/73   05/02/17 141/83    Weight from Last 3 Encounters:   10/05/17 238 lb 14.4 oz (108.4 kg)   09/28/17 241 lb (109.3 kg)    05/02/17 258 lb 1.6 oz (117.1 kg)              We Performed the Following     CBC with platelets and differential     Comprehensive metabolic panel (BMP + Alb, Alk Phos, ALT, AST, Total. Bili, TP)     Hemoglobin A1c     Lipid panel reflex to direct LDL     Magnesium     TSH with free T4 reflex     Vitamin D Deficiency        Primary Care Provider Office Phone # Fax #    Michele Hernandez [phone]       [address] [facility] [address]        Equal Access to Services     GERSON MARES : Hadii aad ku hadasho Soomaali, waaxda luqadaha, qaybta kaalmada adeegyada, waxay idiin hayaan adeeg kharash la'aan ah. [facility] [phone].    ATENCIÓN: Si habla español, tiene a barker disposición servicios gratuitos de asistencia lingüística. [facility] [phone].    We comply with applicable federal civil rights laws and [place] laws. We do not discriminate on the basis of race, color, national origin, age, disability, sex, sexual orientation, or gender identity.            Thank you!     Thank you for choosing [facility]  for your care. Our goal is always to provide you with excellent care. Hearing back from our patients is one way we can continue to improve our services. Please take a few minutes to complete the written survey that you may receive in the mail after your visit with us. Thank you!             Your Updated Medication List - Protect others around you: Learn how to safely use, store and throw away your medicines at www.disposemymeds.org.          This list is accurate as of: 10/5/17 10:47 AM.  Always use your most recent med list.                   Brand Name Dispense Instructions for use Diagnosis    aspirin EC 81 MG EC tablet      Take 81 mg by mouth        blood glucose monitoring lancets     3 Box    Use to test blood sugar 3 times daily    Hyperglycemia       blood glucose monitoring meter device kit     1 kit    Use to test blood sugars 1-3 times daily or as  directed.    Type 2 diabetes mellitus without complication, without long-term current use of insulin (H)       blood glucose monitoring test strip    JESUSITA CONTOUR NEXT    270 each    Test THREE TIMES PER DAY    Type 2 diabetes mellitus without complication, without long-term current use of insulin (H)       levalbuterol 45 MCG/ACT Inhaler    XOPENEX HFA     Inhale 2 puffs into the lungs every 4 hours as needed for shortness of breath / dyspnea or wheezing        levothyroxine 112 MCG tablet    SYNTHROID/LEVOTHROID    90 tablet    Take 1 tablet (112 mcg) by mouth daily    Hashimoto's thyroiditis       MELATONIN PO      Take 5 mg by mouth        TYLENOL 325 MG tablet   Generic drug:  acetaminophen      Take 325-650 mg by mouth every 6 hours as needed for mild pain Take 2000 mg daily        * warfarin 2 MG tablet    COUMADIN     Take 2 mg by mouth daily        * warfarin 4 MG tablet    COUMADIN    180 tablet    Take 2 tablets daily or as directed by [facility]    A-fib (H)       * Notice:  This list has 2 medication(s) that are the same as other medications prescribed for you. Read the directions carefully, and ask your doctor or other care provider to review them with you.

## 2017-10-05 NOTE — NURSING NOTE
"Chief Complaint   Patient presents with     Diabetes     Thyroid Problem       Initial /74  Pulse 77  Temp 97.7  F (36.5  C) (Oral)  Wt 238 lb 14.4 oz (108.4 kg)  SpO2 97%  BMI 35.8 kg/m2 Estimated body mass index is 35.8 kg/(m^2) as calculated from the following:    Height as of 9/28/17: 5' 8.5\" (1.74 m).    Weight as of this encounter: 238 lb 14.4 oz (108.4 kg).  Medication Reconciliation: complete   Ayde Craft CMA      "

## 2017-10-05 NOTE — PROGRESS NOTES
ANTICOAGULATION FOLLOW-UP CLINIC VISIT    Patient Name:  Juju Vaughan  Date:  10/5/2017  Contact Type:  Face to Face    SUBJECTIVE:     Patient Findings     Positives Change in diet/appetite, Missed doses    Comments Out of baby asa, more greens, is in process of moving           OBJECTIVE    INR Protime   Date Value Ref Range Status   10/05/2017 1.6 (A) 0.86 - 1.14 Final       ASSESSMENT / PLAN  INR assessment SUB    Recheck INR In: 8 DAYS    INR Location Clinic      Anticoagulation Summary as of 10/5/2017     INR goal 2.0-3.0   Today's INR 1.6!   Maintenance plan 8 mg (4 mg x 2) on Mon; 6 mg (4 mg x 1 and 2 mg x 1) all other days   Full instructions 10/5: 8 mg; Otherwise 8 mg on Mon; 6 mg all other days   Weekly total 44 mg   Plan last modified Patricia Dinero RN (9/25/2017)   Next INR check 10/13/2017   Target end date     Indications   Atrial fibrillation (H) (Resolved) [I48.91]  Atrial fibrillation with RVR (H) [I48.91]         Anticoagulation Episode Summary     INR check location     Preferred lab     Send INR reminders to  [facility]    Comments             See the Encounter Report to view Anticoagulation Flowsheet and Dosing Calendar (Go to Encounters tab in chart review, and find the Anticoagulation Therapy Visit)        Lo Hamlin RN

## 2017-10-06 LAB — DEPRECATED CALCIDIOL+CALCIFEROL SERPL-MC: 48 UG/L (ref 20–75)

## 2017-10-20 ENCOUNTER — ANTICOAGULATION THERAPY VISIT (OUTPATIENT)
Dept: ANTICOAGULATION | Facility: CLINIC | Age: 72
End: 2017-10-20
Payer: MEDICARE

## 2017-10-20 LAB — INR POINT OF CARE: 2.2 (ref 0.86–1.14)

## 2017-10-20 PROCEDURE — 85610 PROTHROMBIN TIME: CPT | Mod: QW

## 2017-10-20 PROCEDURE — 36416 COLLJ CAPILLARY BLOOD SPEC: CPT

## 2017-10-20 NOTE — MR AVS SNAPSHOT
Juju ENDY Vaughan   10/20/2017 4:00 PM   Anticoagulation Therapy Visit    Description:  72 year old female   Provider:   ANTICOAGULATION CLINIC   Department:   Anti Coagulation           INR as of 10/20/2017     Today's INR 2.2      Anticoagulation Summary as of 10/20/2017     INR goal 2.0-3.0   Today's INR 2.2   Full instructions 6 mg every day   Next INR check 11/3/2017    Indications   Atrial fibrillation (H) (Resolved) [I48.91]  Atrial fibrillation with RVR (H) [I48.91]         Contact Numbers     [facility]  Please call  [phone] to cancel and/or reschedule your appointment   Please call  [phone] with any problems or questions regarding your therapy.        October 2017 Details    Sun Mon Tue Wed Thu Fri Sat     1               2               3               4               5               6               7                 8               9               10               11               12               13               14                 15               16               17               18               19               20      6 mg   See details      21      6 mg           22      6 mg         23      6 mg         24      6 mg         25      6 mg         26      6 mg         27      6 mg         28      6 mg           29      6 mg         30      6 mg         31      6 mg              Date Details   10/20 This INR check               How to take your warfarin dose     To take:  6 mg Take 1 of the 4 mg tablets and 1 of the 2 mg tablets.           November 2017 Details    Sun Mon Tue Wed Thu Fri Sat        1      6 mg         2      6 mg         3            4                 5               6               7               8               9               10               11                 12               13               14               15               16               17               18                 19               20               21               22               23                24               25                 26               27               28               29               30                  Date Details   No additional details    Date of next INR:  11/3/2017         How to take your warfarin dose     To take:  6 mg Take 1 of the 4 mg tablets and 1 of the 2 mg tablets.

## 2017-10-20 NOTE — PROGRESS NOTES
ANTICOAGULATION FOLLOW-UP CLINIC VISIT    Patient Name:  Juju Vaughan  Date:  10/20/2017  Contact Type:  Face to Face    SUBJECTIVE:     Patient Findings     Positives Missed doses (Pt has been taking  6mg everyday insteas of 8mg on Monday)           OBJECTIVE    INR Protime   Date Value Ref Range Status   10/20/2017 2.2 (A) 0.86 - 1.14 Final       ASSESSMENT / PLAN  INR assessment THER    Recheck INR In: 2 WEEKS    INR Location Clinic      Anticoagulation Summary as of 10/20/2017     INR goal 2.0-3.0   Today's INR 2.2   Maintenance plan 8 mg (4 mg x 2) on Mon; 6 mg (4 mg x 1 and 2 mg x 1) all other days   Full instructions 8 mg on Mon; 6 mg all other days   Weekly total 44 mg   Plan last modified Patricia Dinero RN (9/25/2017)   Next INR check 11/3/2017   Target end date     Indications   Atrial fibrillation (H) (Resolved) [I48.91]  Atrial fibrillation with RVR (H) [I48.91]         Anticoagulation Episode Summary     INR check location     Preferred lab     Send INR reminders to [facility]    Comments             See the Encounter Report to view Anticoagulation Flowsheet and Dosing Calendar (Go to Encounters tab in chart review, and find the Anticoagulation Therapy Visit)    Dosage adjustment made based on physician directed care plan.    Thao Emery, RN

## 2017-10-24 ENCOUNTER — MYC REFILL (OUTPATIENT)
Dept: ENDOCRINOLOGY | Facility: CLINIC | Age: 72
End: 2017-10-24

## 2017-10-24 ENCOUNTER — MYC REFILL (OUTPATIENT)
Dept: INTERNAL MEDICINE | Facility: CLINIC | Age: 72
End: 2017-10-24

## 2017-10-24 DIAGNOSIS — I48.91 A-FIB (H): ICD-10-CM

## 2017-10-24 DIAGNOSIS — I48.91 ATRIAL FIBRILLATION WITH RVR (H): Primary | ICD-10-CM

## 2017-10-24 DIAGNOSIS — E11.9 TYPE 2 DIABETES MELLITUS WITHOUT COMPLICATION, WITHOUT LONG-TERM CURRENT USE OF INSULIN (H): ICD-10-CM

## 2017-10-24 DIAGNOSIS — E06.3 HASHIMOTO'S THYROIDITIS: ICD-10-CM

## 2017-10-24 RX ORDER — WARFARIN SODIUM 4 MG/1
TABLET ORAL
Qty: 135 TABLET | Refills: 0 | Status: SHIPPED | OUTPATIENT
Start: 2017-10-24

## 2017-10-24 RX ORDER — WARFARIN SODIUM 4 MG/1
TABLET ORAL
Qty: 180 TABLET | Refills: 0 | OUTPATIENT
Start: 2017-10-24

## 2017-10-24 RX ORDER — LEVOTHYROXINE SODIUM 112 UG/1
112 TABLET ORAL DAILY
Qty: 90 TABLET | Refills: 3 | Status: CANCELLED | OUTPATIENT
Start: 2017-10-24

## 2017-10-24 RX ORDER — WARFARIN SODIUM 2 MG/1
TABLET ORAL
Qty: 270 TABLET | Refills: 0 | Status: SHIPPED | OUTPATIENT
Start: 2017-10-24

## 2017-10-24 NOTE — TELEPHONE ENCOUNTER
Message from [employer]:  Original authorizing provider: Michele Hernandez MD    Juju SCHUMACHERJulius Akron would like a refill of the following medications:  warfarin (COUMADIN) 4 MG tablet [Michele Hernandez MD]    Preferred pharmacy: [address]    Comment:  This is the [facility] Contact. They have asked for an INR prescription. I have an appointment for routine followup next week. Thank you. [facility] [address] [place] [address] [address] [place] [address] Phone: [phone] Fax: [phone]

## 2017-10-24 NOTE — TELEPHONE ENCOUNTER
Prescription approved per [facility] Refill Protocol. Pt requested 2mg tablets instead of 4 mg tablets, called pharm, they omitted the 4mg rx and will change to 2mg.    Pt called informed to contact [facility] pharm, and have them transfer her rx's to new pharmacy in [place].

## 2017-10-24 NOTE — TELEPHONE ENCOUNTER
Message from [employer]:  Original authorizing provider: Michlee Hernandez MD    Juju Vaughan would like a refill of the following medications:  warfarin (COUMADIN) 4 MG tablet [Michele Hernandez MD]    Preferred pharmacy: [address]    Comment:  I am temporarily moving to [place], [place]. Please forward my prescriptions to the [facility] in [place]. They have requested this. I will be getting refills at the Pharmacy attached to the Clinic. I will continue to see the [facility] and [facility] physicians for yearly checks. I would like a prescription for 2 mg tabs only in the future. It is easier to alter doses with 2 mg. pills. Will require additional glucose strips and synthroid in approximately a month. Many thanks!    Medication renewals requested in this message routed to other providers:  blood glucose monitoring (JESUSITA CONTOUR NEXT) test strip [Loida Christiansen MD]  levothyroxine (SYNTHROID/LEVOTHROID) 112 MCG tablet [Loida Christiansen MD]

## 2017-10-31 ENCOUNTER — ANTICOAGULATION THERAPY VISIT (OUTPATIENT)
Dept: ANTICOAGULATION | Facility: CLINIC | Age: 72
End: 2017-10-31
Payer: MEDICARE

## 2017-10-31 LAB — INR POINT OF CARE: 2.7 (ref 0.86–1.14)

## 2017-10-31 PROCEDURE — 36416 COLLJ CAPILLARY BLOOD SPEC: CPT

## 2017-10-31 PROCEDURE — 85610 PROTHROMBIN TIME: CPT | Mod: QW

## 2017-10-31 NOTE — MR AVS SNAPSHOT
Juju Vaughan   10/31/2017 2:30 PM   Anticoagulation Therapy Visit    Description:  72 year old female   Provider:   ANTICOAGULATION CLINIC   Department:   Anti Coagulation           INR as of 10/31/2017     Today's INR 2.7      Anticoagulation Summary as of 10/31/2017     INR goal 2.0-3.0   Today's INR 2.7   Full instructions 6 mg every day   Next INR check 11/14/2017    Indications   Atrial fibrillation (H) (Resolved) [I48.91]         Contact Numbers     [facility]  Please call  [phone] to cancel and/or reschedule your appointment   Please call  [phone] with any problems or questions regarding your therapy.        October 2017 Details    Sun Mon Tue Wed Thu Fri Sat     1               2               3               4               5               6               7                 8               9               10               11               12               13               14                 15               16               17               18               19               20               21                 22               23               24               25               26               27               28                 29               30               31      6 mg   See details           Date Details   10/31 This INR check               How to take your warfarin dose     To take:  6 mg Take 1 of the 4 mg tablets and 1 of the 2 mg tablets.           November 2017 Details    Sun Mon Tue Wed Thu Fri Sat        1      6 mg         2      6 mg         3      6 mg         4      6 mg           5      6 mg         6      6 mg         7      6 mg         8      6 mg         9      6 mg         10      6 mg         11      6 mg           12      6 mg         13      6 mg         14            15               16               17               18                 19               20               21               22               23               24               25                  26               27               28               29               30                  Date Details   No additional details    Date of next INR:  11/14/2017         How to take your warfarin dose     To take:  6 mg Take 1 of the 4 mg tablets and 1 of the 2 mg tablets.

## 2017-10-31 NOTE — PROGRESS NOTES
ANTICOAGULATION FOLLOW-UP CLINIC VISIT    Patient Name:  Juju Vaughan  Date:  10/31/2017  Contact Type:  Face to Face    SUBJECTIVE:     Patient Findings     Positives No Problem Findings           OBJECTIVE    INR Protime   Date Value Ref Range Status   10/31/2017 2.7 (A) 0.86 - 1.14 Final       ASSESSMENT / PLAN  INR assessment THER    Recheck INR In: 2 WEEKS    INR Location Clinic      Anticoagulation Summary as of 10/31/2017     INR goal 2.0-3.0   Today's INR 2.7   Maintenance plan 6 mg (4 mg x 1 and 2 mg x 1) every day   Full instructions 6 mg every day   Weekly total 42 mg   Plan last modified Thao Eemry RN (10/31/2017)   Next INR check 11/14/2017   Target end date     Indications   Atrial fibrillation (H) (Resolved) [I48.91]         Anticoagulation Episode Summary     INR check location [facility]    Preferred lab     Send INR reminders to  [facility]    Comments             See the Encounter Report to view Anticoagulation Flowsheet and Dosing Calendar (Go to Encounters tab in chart review, and find the Anticoagulation Therapy Visit)        Thao Emery RN

## 2017-12-29 DIAGNOSIS — I48.91 ATRIAL FIBRILLATION WITH RVR (H): ICD-10-CM

## 2018-01-01 ENCOUNTER — ALLIED HEALTH/NURSE VISIT (OUTPATIENT)
Dept: CARDIOLOGY | Facility: CLINIC | Age: 73
End: 2018-01-01
Payer: MEDICARE

## 2018-01-01 ENCOUNTER — OFFICE VISIT (OUTPATIENT)
Dept: OBGYN | Facility: CLINIC | Age: 73
End: 2018-01-01
Attending: OBSTETRICS & GYNECOLOGY
Payer: MEDICARE

## 2018-01-01 ENCOUNTER — RADIANT APPOINTMENT (OUTPATIENT)
Dept: CARDIOLOGY | Facility: CLINIC | Age: 73
End: 2018-01-01
Payer: MEDICARE

## 2018-01-01 ENCOUNTER — PATIENT OUTREACH (OUTPATIENT)
Dept: CARE COORDINATION | Facility: CLINIC | Age: 73
End: 2018-01-01

## 2018-01-01 ENCOUNTER — RADIANT APPOINTMENT (OUTPATIENT)
Dept: MAMMOGRAPHY | Facility: CLINIC | Age: 73
End: 2018-01-01
Attending: OBSTETRICS & GYNECOLOGY
Payer: MEDICARE

## 2018-01-01 ENCOUNTER — OFFICE VISIT (OUTPATIENT)
Dept: CARDIOLOGY | Facility: CLINIC | Age: 73
End: 2018-01-01
Attending: INTERNAL MEDICINE
Payer: MEDICARE

## 2018-01-01 VITALS
DIASTOLIC BLOOD PRESSURE: 80 MMHG | SYSTOLIC BLOOD PRESSURE: 137 MMHG | HEART RATE: 85 BPM | HEIGHT: 68 IN | WEIGHT: 251.6 LBS | BODY MASS INDEX: 38.13 KG/M2

## 2018-01-01 VITALS
WEIGHT: 248.1 LBS | HEIGHT: 69 IN | DIASTOLIC BLOOD PRESSURE: 75 MMHG | HEART RATE: 64 BPM | SYSTOLIC BLOOD PRESSURE: 119 MMHG | BODY MASS INDEX: 36.75 KG/M2

## 2018-01-01 VITALS
SYSTOLIC BLOOD PRESSURE: 140 MMHG | WEIGHT: 250 LBS | HEIGHT: 68 IN | BODY MASS INDEX: 37.89 KG/M2 | HEART RATE: 74 BPM | DIASTOLIC BLOOD PRESSURE: 86 MMHG | OXYGEN SATURATION: 98 %

## 2018-01-01 DIAGNOSIS — I48.21 PERMANENT ATRIAL FIBRILLATION (H): ICD-10-CM

## 2018-01-01 DIAGNOSIS — Z95.0 CARDIAC PACEMAKER IN SITU: ICD-10-CM

## 2018-01-01 DIAGNOSIS — Z98.890 HX OF ATRIOVENTRICULAR NODE ABLATION: Primary | ICD-10-CM

## 2018-01-01 DIAGNOSIS — I47.29 NONSUSTAINED VENTRICULAR TACHYCARDIA (H): ICD-10-CM

## 2018-01-01 DIAGNOSIS — Z85.42 HISTORY OF ENDOMETRIAL CANCER: Primary | ICD-10-CM

## 2018-01-01 DIAGNOSIS — I47.29 NONSUSTAINED VENTRICULAR TACHYCARDIA (H): Primary | ICD-10-CM

## 2018-01-01 DIAGNOSIS — Z12.31 VISIT FOR SCREENING MAMMOGRAM: ICD-10-CM

## 2018-01-01 DIAGNOSIS — Z98.890 HX OF ATRIOVENTRICULAR NODE ABLATION: ICD-10-CM

## 2018-01-01 PROCEDURE — G0463 HOSPITAL OUTPT CLINIC VISIT: HCPCS | Mod: ZF

## 2018-01-01 PROCEDURE — 99205 OFFICE O/P NEW HI 60 MIN: CPT | Mod: ZP | Performed by: INTERNAL MEDICINE

## 2018-01-01 PROCEDURE — 77067 SCR MAMMO BI INCL CAD: CPT

## 2018-01-01 PROCEDURE — G0463 HOSPITAL OUTPT CLINIC VISIT: HCPCS | Mod: 25,ZF

## 2018-01-01 RX ORDER — ATENOLOL 25 MG/1
25 TABLET ORAL DAILY
Qty: 90 TABLET | Refills: 3 | Status: SHIPPED | OUTPATIENT
Start: 2018-01-01

## 2018-01-01 ASSESSMENT — PAIN SCALES - GENERAL
PAINLEVEL: NO PAIN (0)
PAINLEVEL: NO PAIN (0)

## 2018-01-01 NOTE — TELEPHONE ENCOUNTER
Requested Prescriptions   Pending Prescriptions Disp Refills     JANTOVEN 2 MG tablet [Pharmacy Med Name: JANTOVEN 2MG TABS]  Last Written Prescription Date:  10/24/17  Last Fill Quantity: 270 tablet,  # refills: 0   Last Office Visit with G, P or [facility] prescribing provider:  10/5/17   Future Office Visit:      270 tablet 0     Sig: TAKE 3 TABLETS BY MOUTH ONCE DAILY AS DIRECTED BY ANTICOAGULATION CLINIC    Vitamin K Antagonists Failed    12/29/2017 10:25 AM       Failed - INR is within goal in the past 6 weeks    Confirm INR is within goal in the past 6 weeks.     Recent Labs   Lab Test 10/31/17   INR  2.7*            Passed - Recent or future visit with authorizing provider's specialty    Patient had office visit in the last year or has a visit in the next 30 days with authorizing provider.  See chart review.          Passed - Patient is 18 years of age or older       Passed - Patient is not pregnant       Passed - No positive pregnancy on file in past 12 months

## 2018-01-02 RX ORDER — WARFARIN SODIUM 2 MG/1
TABLET ORAL
Qty: 270 TABLET | Refills: 0 | OUTPATIENT
Start: 2018-01-02

## 2018-01-02 NOTE — TELEPHONE ENCOUNTER
Spoke with patient and she is having her INR's checked at the [facility] at this time.  She will contact their office to see if they can refill warfarin, if not pt will need to have her records sent to our office from recent anticoagulation visits.    RN unable to refuse medications.  Routed to the pool for MD to refuse.

## 2018-01-03 ENCOUNTER — ANTICOAGULATION THERAPY VISIT (OUTPATIENT)
Dept: ANTICOAGULATION | Facility: CLINIC | Age: 73
End: 2018-01-03

## 2018-01-03 NOTE — MR AVS SNAPSHOT
Juju Vaughan   1/3/2018   Anticoagulation Therapy Visit    Description:  72 year old female   Provider:  Michele Hernandez MD   Department:   Anti Coagulation           INR as of 1/3/2018     Today's INR       Anticoagulation Summary as of 1/3/2018     INR goal 2.0-3.0   Today's INR    Full instructions 6 mg every day   Next INR check     Indications   Atrial fibrillation (H) (Resolved) [I48.91]         Anticoagulation Episode Summary     Resolved date 1/3/2018    Resolved reason Patient moved      Contact Numbers     [facility]  Please call  [phone] to cancel and/or reschedule your appointment   Please call  [phone] with any problems or questions regarding your therapy.

## 2018-01-04 NOTE — TELEPHONE ENCOUNTER
Whoever is managing her INRs should refill the Warfarin.   If she is having the new clinic monitor the INRs, then they should be refilling this medication.   If she lives in [place], she should definitely have a clinic closer to her home there.   I think that there are [facility] in that region as well if that works for her.

## 2018-04-11 NOTE — LETTER
"4/11/2018       [facility]  [address] [facility] [address]     Dear Colleague,    Thank you for referring your patient, Juju Vaughan, to the [facility] at [facility]. Please see a copy of my visit note below.    CC/HPI:   Juju Vaughan is a 72 year old female who presents today for her endometrial cancer surveillance visit. In 3/2014 she was diagnosed with grade 1, stage 1A endometrial cancer and underwent Davinci TLH/BSO.     Since her last visit she has been doing well. She denies any issues with vaginal bleeding, discharge or irritation. No other changes in her health. Her Afib has not been an issue since having pacemaker placed. Recently bought new house and got a new dog.     HCM: Pap- 2016 vaginal cuff  Mammogram- 9/2017 normal  Colonoscopy- 3/2016 normal  DEXA- 6/2014 normal     HISTORIES:  Patient Active Problem List   Diagnosis     Hot thyroid nodule     Atrial fibrillation with RVR (H)     CHF (congestive heart failure) (H)     Hypothyroidism, unspecified type     Endometrial cancer (H)     CARDIOVASCULAR SCREENING; LDL GOAL LESS THAN 160     Abnormal glucose     Vitamin D deficiency     Tachycardia-bradycardia syndrome (H)     Type 2 diabetes mellitus without complication, without long-term current use of insulin (H)     Past Medical History:   Diagnosis Date     A-fib (H)     Chronic anticoagulation and antiarrhythmic drugs; has required multiple conversions     Antiplatelet or antithrombotic long-term use      Arrhythmia      ASD (atrial septal defect)     repaired at age 40     Difficulty in walking(719.7)      Eating disorder Current    Food addiction per patient; Sees private therapist at [facility]     Hypothyroidism (acquired)     Due to \"hot nodule,\" saw endocrine at [place] and was given radiation therapy, which \"killed the whole thyroid.\" No reported malignancy. Now follows with Endocrine at [facility] and on levothyroxine.     " Irregular heart beat      Obesity      Obstructive sleep apnea Current    Uses CPAP; sees specialist     Rh incompatibility      Shortness of breath      Type 2 diabetes mellitus without complication (H)      Walking troubles      Past Surgical History:   Procedure Laterality Date     BIOPSY       COLONOSCOPY N/A 3/28/2016    Procedure: COMBINED COLONOSCOPY, SINGLE OR MULTIPLE BIOPSY/POLYPECTOMY BY BIOPSY;  Surgeon: Angela Herbert MD;  Location: [facility]     CYSTOSCOPY N/A 9/24/2014    Procedure: CYSTOSCOPY;  Surgeon: Anamaria Sher MD;  Location: [facility] OR     DAVINCI HYSTERECTOMY TOTAL, BILATERAL SALPINGO-OOPHORECTOMY, COMBINED N/A 9/24/2014    Procedure: COMBINED DAVINCI HYSTERECTOMY TOTAL, SALPINGO-OOPHORECTOMY;  Surgeon: Anamaria Sher MD;  Location: [facility] OR     H ABLATION FOCAL AFIB       HAND SURGERY      Repair after broken hand from fall; metal kiko placed between radius and ulna     KNEE SURGERY       ORTHOPEDIC SURGERY       REPAIR ATRIAL SEPTAL DEFECT      Age 40     THORACIC SURGERY       TONSILLECTOMY       TUBAL LIGATION      After second vaginal delivery     VASCULAR SURGERY       Current Outpatient Prescriptions   Medication Sig Dispense Refill     warfarin (COUMADIN) 2 MG tablet 3 tablets (6mg) daily as directed by  tablet 0     MELATONIN PO Take 5 mg by mouth       aspirin EC 81 MG EC tablet Take 81 mg by mouth       levothyroxine (SYNTHROID/LEVOTHROID) 112 MCG tablet Take 1 tablet (112 mcg) by mouth daily 90 tablet 3     acetaminophen (TYLENOL) 325 MG tablet Take 325-650 mg by mouth every 6 hours as needed for mild pain Take 2000 mg daily       warfarin (COUMADIN) 4 MG tablet Take 1 1/2 tablets daily or as directed by [facility] clinic (Patient not taking: Reported on 4/11/2018) 135 tablet 0     warfarin (COUMADIN) 2 MG tablet Take 2 mg by mouth daily       blood glucose monitoring (JESUSITA CONTOUR NEXT) test strip Test THREE TIMES PER  each 3     blood glucose monitoring (JESUSITA  MICROLET) lancets Use to test blood sugar 3 times daily 3 Box 3     blood glucose monitoring (JESUSITA CONTOUR MONITOR) meter device kit Use to test blood sugars 1-3 times daily or as directed. 1 kit 0     levalbuterol (XOPENEX HFA) 45 MCG/ACT inhaler Inhale 2 puffs into the lungs every 4 hours as needed for shortness of breath / dyspnea or wheezing       Allergies   Allergen Reactions     Animal Dander      Contrast Dye Other (See Comments)     Family history of reaction.  Patient has not had reaction.       Flecainide Muscle Pain (Myalgia)     Iodine I 131 Tositumomab      Morphine Hcl Itching     Percocet [Oxycodone-Acetaminophen]      Perfume Difficulty breathing     Seafood      Dehydrated with high fever     Tape [Adhesive Tape] Blisters     All tape, dermabond, except paper tape     Vicodin [Hydrocodone-Acetaminophen] Other (See Comments)     Flu-like symptoms     Walnuts [Nuts]      Social History     Social History     Marital status:      Spouse name: N/A     Number of children: N/A     Years of education: N/A     Occupational History     Not on file.     Social History Main Topics     Smoking status: Never Smoker     Smokeless tobacco: Never Used     Alcohol use No     Drug use: No     Sexual activity: Not Currently     Partners: Male     Birth control/ protection: Surgical      Comment: hysterectomy     Other Topics Concern     Not on file     Social History Narrative     Family History   Problem Relation Age of Onset     Breast Cancer No family hx of      Cancer - colorectal No family hx of      Anxiety Disorder No family hx of      DIABETES No family hx of      Hypertension No family hx of      OSTEOPOROSIS No family hx of      CEREBROVASCULAR DISEASE No family hx of      Obesity No family hx of             Review Of Systems:  CONSTITUTIONAL: NEGATIVE for fever, chills  EYES: NEGATIVE for vision changes   RESP: NEGATIVE for significant cough or SOB  CV: NEGATIVE for chest pain, palpitations   GI:  "NEGATIVE for nausea, abdominal pain, heartburn, or change in bowel habits  : NEGATIVE for frequency, dysuria, or hematuria  MUSCULOSKELETAL: NEGATIVE for significant arthralgias or myalgia  NEURO: NEGATIVE for weakness, dizziness or paresthesias or headache    EXAM:  /75 (BP Location: Right arm, Patient Position: Chair)  Pulse 64  Ht 1.74 m (5' 8.5\")  Wt 112.5 kg (248 lb 1.6 oz)  Breastfeeding? No  BMI 37.17 kg/m2  Body mass index is 37.17 kg/(m^2).    General - pleasant female in no acute distress.  Skin - no suspicious lesions or rashes  EENT-  PERRLA, euthyroid with out palpable nodules  Neck - supple without lymphadenopathy.  Lungs - clear to auscultation bilaterally.  Heart - regular rate and rhythm without murmur.  Abdomen - soft, nontender, nondistended, no masses or organomegaly noted.  Musculoskeletal - no gross deformities.  Neurological - normal strength, sensation, and mental status.  Pelvic - EG: normal  female, vulva reveals no erythema or lesions.   BUS: within normal limits.  Vagina: atrophic, vaginal cuff without lesions    Cervix: no lesions, polyps discharge or CMT.  Uterus: surgically absent  Adnexa: no masses or tenderness.  Anus- normal, no lesions.  Rectovaginal - deferred.    ASSESSMENT/PLAN  Endometrial cancer surveillance visit    - Normal exam today  - Follow up in 6 months      Again, thank you for allowing me to participate in the care of your patient.      Sincerely,    Ayde Terry MD      "

## 2018-04-11 NOTE — NURSING NOTE
Chief Complaint   Patient presents with     Follow Up For     6 months endometrial cancer surveillance       See DU Mayen 4/11/2018

## 2018-04-11 NOTE — MR AVS SNAPSHOT
"              After Visit Summary   4/11/2018    Juju Vaughan    MRN: [id number]           Patient Information     Date Of Birth          1945        Visit Information        Provider Department      4/11/2018 12:45 PM Ayde Terry MD [facility]        Today's Diagnoses     History of endometrial cancer    -  1       Follow-ups after your visit        Who to contact     Please call your clinic at [phone] to:    Ask questions about your health    Make or cancel appointments    Discuss your medicines    Learn about your test results    Speak to your doctor            Additional Information About Your Visit        MyChart Information     [employer] gives you secure access to your electronic health record. If you see a primary care provider, you can also send messages to your care team and make appointments. If you have questions, please call your primary care clinic.  If you do not have a primary care provider, please call [phone] and they will assist you.      [employer] is an electronic gateway that provides easy, online access to your medical records. With [employer], you can request a clinic appointment, read your test results, renew a prescription or communicate with your care team.     To access your existing account, please contact your [facility] or call [phone] for assistance.        Care EveryWhere ID     This is your Care EveryWhere ID. This could be used by other organizations to access your [facility] medical records  [id number]        Your Vitals Were     Pulse Height Breastfeeding? BMI (Body Mass Index)          64 1.74 m (5' 8.5\") No 37.17 kg/m2         Blood Pressure from Last 3 Encounters:   04/11/18 119/75   10/05/17 126/74   09/28/17 119/73    Weight from Last 3 Encounters:   04/11/18 112.5 kg (248 lb 1.6 oz)   10/05/17 108.4 kg (238 lb 14.4 oz)   09/28/17 109.3 kg (241 lb)              Today, you had the following     No " orders found for display       Primary Care Provider Office Phone # Fax #    Michele Hernandez [phone]       [address] [facility] [address]        Equal Access to Services     GERSON MARES : Hadgrant randy ku remyo Solizettali, waaxda luqadaha, qaybta kaalmada adeamber, saadia villarrealroxanne amin. So [facility] [phone].    ATENCIÓN: Si habla español, tiene a barker disposición servicios gratuitos de asistencia lingüística. Llame al [phone].    We comply with applicable federal civil rights laws and [place] laws. We do not discriminate on the basis of race, color, national origin, age, disability, sex, sexual orientation, or gender identity.            Thank you!     Thank you for choosing [facility]  for your care. Our goal is always to provide you with excellent care. Hearing back from our patients is one way we can continue to improve our services. Please take a few minutes to complete the written survey that you may receive in the mail after your visit with us. Thank you!             Your Updated Medication List - Protect others around you: Learn how to safely use, store and throw away your medicines at www.disposemymeds.org.          This list is accurate as of 4/11/18 11:59 PM.  Always use your most recent med list.                   Brand Name Dispense Instructions for use Diagnosis    aspirin EC 81 MG EC tablet      Take 81 mg by mouth        blood glucose monitoring lancets     3 Box    Use to test blood sugar 3 times daily    Hyperglycemia       blood glucose monitoring meter device kit     1 kit    Use to test blood sugars 1-3 times daily or as directed.    Type 2 diabetes mellitus without complication, without long-term current use of insulin (H)       blood glucose monitoring test strip    JESUSITA CONTOUR NEXT    270 each    Test THREE TIMES PER DAY    Type 2 diabetes mellitus without complication, without long-term current use of insulin  (H)       levalbuterol 45 MCG/ACT Inhaler    XOPENEX HFA     Inhale 2 puffs into the lungs every 4 hours as needed for shortness of breath / dyspnea or wheezing        levothyroxine 112 MCG tablet    SYNTHROID/LEVOTHROID    90 tablet    Take 1 tablet (112 mcg) by mouth daily    Hashimoto's thyroiditis       MELATONIN PO      Take 5 mg by mouth        TYLENOL 325 MG tablet   Generic drug:  acetaminophen      Take 325-650 mg by mouth every 6 hours as needed for mild pain Take 2000 mg daily        * warfarin 2 MG tablet    COUMADIN     Take 2 mg by mouth daily        * warfarin 4 MG tablet    COUMADIN    135 tablet    Take 1 1/2 tablets daily or as directed by [facility] clinic    A-fib (H)       * warfarin 2 MG tablet    COUMADIN    270 tablet    3 tablets (6mg) daily as directed by [facility]    Atrial fibrillation with RVR (H)       * Notice:  This list has 3 medication(s) that are the same as other medications prescribed for you. Read the directions carefully, and ask your doctor or other care provider to review them with you.

## 2018-04-16 NOTE — PROGRESS NOTES
"CC/HPI:   Juju Vaughan is a 72 year old female who presents today for her endometrial cancer surveillance visit. In 3/2014 she was diagnosed with grade 1, stage 1A endometrial cancer and underwent Davinci TLH/BSO.     Since her last visit she has been doing well. She denies any issues with vaginal bleeding, discharge or irritation. No other changes in her health. Her Afib has not been an issue since having pacemaker placed. Recently bought new house and got a new dog.     HCM: Pap- 2016 vaginal cuff  Mammogram- 9/2017 normal  Colonoscopy- 3/2016 normal  DEXA- 6/2014 normal     HISTORIES:  Patient Active Problem List   Diagnosis     Hot thyroid nodule     Atrial fibrillation with RVR (H)     CHF (congestive heart failure) (H)     Hypothyroidism, unspecified type     Endometrial cancer (H)     CARDIOVASCULAR SCREENING; LDL GOAL LESS THAN 160     Abnormal glucose     Vitamin D deficiency     Tachycardia-bradycardia syndrome (H)     Type 2 diabetes mellitus without complication, without long-term current use of insulin (H)     Past Medical History:   Diagnosis Date     A-fib (H)     Chronic anticoagulation and antiarrhythmic drugs; has required multiple conversions     Antiplatelet or antithrombotic long-term use      Arrhythmia      ASD (atrial septal defect)     repaired at age 40     Difficulty in walking(719.7)      Eating disorder Current    Food addiction per patient; Sees private therapist at [facility]     Hypothyroidism (acquired)     Due to \"hot nodule,\" saw endocrine at [facility] and was given radiation therapy, which \"killed the whole thyroid.\" No reported malignancy. Now follows with Endocrine at [facility] and on levothyroxine.     Irregular heart beat      Obesity      Obstructive sleep apnea Current    Uses CPAP; sees specialist     Rh incompatibility      Shortness of breath      Type 2 diabetes mellitus without complication (H)      Walking troubles      Past Surgical History:   Procedure Laterality Date     BIOPSY  "      COLONOSCOPY N/A 3/28/2016    Procedure: COMBINED COLONOSCOPY, SINGLE OR MULTIPLE BIOPSY/POLYPECTOMY BY BIOPSY;  Surgeon: Angela Herbert MD;  Location: [facility]     CYSTOSCOPY N/A 9/24/2014    Procedure: CYSTOSCOPY;  Surgeon: Anamaria Sher MD;  Location: [facility]     DAVINCI HYSTERECTOMY TOTAL, BILATERAL SALPINGO-OOPHORECTOMY, COMBINED N/A 9/24/2014    Procedure: COMBINED DAVINCI HYSTERECTOMY TOTAL, SALPINGO-OOPHORECTOMY;  Surgeon: Anamaria Sher MD;  Location: [facility]     H ABLATION FOCAL AFIB       HAND SURGERY      Repair after broken hand from fall; metal kiko placed between radius and ulna     KNEE SURGERY       ORTHOPEDIC SURGERY       REPAIR ATRIAL SEPTAL DEFECT      Age 40     THORACIC SURGERY       TONSILLECTOMY       TUBAL LIGATION      After second vaginal delivery     VASCULAR SURGERY       Current Outpatient Prescriptions   Medication Sig Dispense Refill     warfarin (COUMADIN) 2 MG tablet 3 tablets (6mg) daily as directed by  tablet 0     MELATONIN PO Take 5 mg by mouth       aspirin EC 81 MG EC tablet Take 81 mg by mouth       levothyroxine (SYNTHROID/LEVOTHROID) 112 MCG tablet Take 1 tablet (112 mcg) by mouth daily 90 tablet 3     acetaminophen (TYLENOL) 325 MG tablet Take 325-650 mg by mouth every 6 hours as needed for mild pain Take 2000 mg daily       warfarin (COUMADIN) 4 MG tablet Take 1 1/2 tablets daily or as directed by [facility] (Patient not taking: Reported on 4/11/2018) 135 tablet 0     warfarin (COUMADIN) 2 MG tablet Take 2 mg by mouth daily       blood glucose monitoring (JESUSITA CONTOUR NEXT) test strip Test THREE TIMES PER  each 3     blood glucose monitoring (JESUSITA MICROLET) lancets Use to test blood sugar 3 times daily 3 Box 3     blood glucose monitoring (JESUSITA CONTOUR MONITOR) meter device kit Use to test blood sugars 1-3 times daily or as directed. 1 kit 0     levalbuterol (XOPENEX HFA) 45 MCG/ACT inhaler Inhale 2 puffs into the lungs every 4 hours as  needed for shortness of breath / dyspnea or wheezing       Allergies   Allergen Reactions     Animal Dander      Contrast Dye Other (See Comments)     Family history of reaction.  Patient has not had reaction.       Flecainide Muscle Pain (Myalgia)     Iodine I 131 Tositumomab      Morphine Hcl Itching     Percocet [Oxycodone-Acetaminophen]      Perfume Difficulty breathing     Seafood      Dehydrated with high fever     Tape [Adhesive Tape] Blisters     All tape, dermabond, except paper tape     Vicodin [Hydrocodone-Acetaminophen] Other (See Comments)     Flu-like symptoms     Walnuts [Nuts]      Social History     Social History     Marital status:      Spouse name: N/A     Number of children: N/A     Years of education: N/A     Occupational History     Not on file.     Social History Main Topics     Smoking status: Never Smoker     Smokeless tobacco: Never Used     Alcohol use No     Drug use: No     Sexual activity: Not Currently     Partners: Male     Birth control/ protection: Surgical      Comment: hysterectomy     Other Topics Concern     Not on file     Social History Narrative     Family History   Problem Relation Age of Onset     Breast Cancer No family hx of      Cancer - colorectal No family hx of      Anxiety Disorder No family hx of      DIABETES No family hx of      Hypertension No family hx of      OSTEOPOROSIS No family hx of      CEREBROVASCULAR DISEASE No family hx of      Obesity No family hx of             Review Of Systems:  CONSTITUTIONAL: NEGATIVE for fever, chills  EYES: NEGATIVE for vision changes   RESP: NEGATIVE for significant cough or SOB  CV: NEGATIVE for chest pain, palpitations   GI: NEGATIVE for nausea, abdominal pain, heartburn, or change in bowel habits  : NEGATIVE for frequency, dysuria, or hematuria  MUSCULOSKELETAL: NEGATIVE for significant arthralgias or myalgia  NEURO: NEGATIVE for weakness, dizziness or paresthesias or headache    EXAM:  /75 (BP Location:  "Right arm, Patient Position: Chair)  Pulse 64  Ht 1.74 m (5' 8.5\")  Wt 112.5 kg (248 lb 1.6 oz)  Breastfeeding? No  BMI 37.17 kg/m2  Body mass index is 37.17 kg/(m^2).    General - pleasant female in no acute distress.  Skin - no suspicious lesions or rashes  EENT-  PERRLA, euthyroid with out palpable nodules  Neck - supple without lymphadenopathy.  Lungs - clear to auscultation bilaterally.  Heart - regular rate and rhythm without murmur.  Abdomen - soft, nontender, nondistended, no masses or organomegaly noted.  Musculoskeletal - no gross deformities.  Neurological - normal strength, sensation, and mental status.  Pelvic - EG: normal  female, vulva reveals no erythema or lesions.   BUS: within normal limits.  Vagina: atrophic, vaginal cuff without lesions    Cervix: no lesions, polyps discharge or CMT.  Uterus: surgically absent  Adnexa: no masses or tenderness.  Anus- normal, no lesions.  Rectovaginal - deferred.    ASSESSMENT/PLAN  Endometrial cancer surveillance visit    - Normal exam today  - Follow up in 6 months    Ayde Terry MD  "

## 2018-10-30 NOTE — MR AVS SNAPSHOT
After Visit Summary   10/30/2018    Juju Vaughan    MRN: [id number]           Patient Information     Date Of Birth          1945        Visit Information        Provider Department      10/30/2018 10:00 AM 1, Martin Cv Device [facility]        Today's Diagnoses     Hx of atrioventricular node ablation    -  1      Care Instructions    It was a pleasure to see you in the clinic today. Please do not hesitate to call with any questions or concerns. Your next scheduled remote transmission will be in 3 months and we look forward to seeing you in clinic at your next device check in 6 months.     Cristina Becerra RN, BSN  Electrophysiology Nurse Clinician  [facility]    During Business hours Please Call: [phone]  After Hours Please Call: [phone] - select option number 4 and ask for job code [id number]                  Follow-ups after your visit        Your next 10 appointments already scheduled     Nov 08, 2018  1:00 PM CST   MA SCREENING DIGITAL BILATERAL with [id number]   [facility] ([email or web address] Clinics)    [address] [place] [address], [address]  [facility] [address] [phone]           How do I prepare for my exam? (Food and drink instructions) No Food and Drink Restrictions.  How do I prepare for my exam? (Other instructions) Do not use any powder, lotion or deodorant under your arms or on your breast. If you do, we will ask you to remove it before your exam.  What should I wear: Wear comfortable, two-piece clothing.  How long does the exam take: Most scans will take 15 minutes.  What should I bring: Bring any previous mammograms from other facilities or have them mailed to the breast center.  Do I need a :  No  is needed.  What do I need to tell my doctor: If you have any allergies, tell your care team.  What should I do after the exam: No restrictions, You may resume normal activities.  What is this test: This test is an x-ray of the  "breast to look for breast disease. The breast is pressed between two plates to flatten and spread the tissue. An X-ray is taken of the breast from different angles.  Who should I call with questions: If you have any questions, please call the Imaging Department where you will have your exam. Directions, parking instructions, and other information is available on our website, [email or web address].  Other information about my exam Three-dimensional (3D) mammograms are available at [place] locations in [facility], [facility], [facility], [place], [facility] and [place]. [facility] locations include [place] and the [facility] in [place].  Benefits of 3D mammograms include * Improved rate of cancer detection * Decreases your chance of having to go back for more tests, which means fewer: * \"False-positive\" results (This means that there is an abnormal area but it isn't cancer.) * Invasive testing procedures, such as a biopsy or surgery * Can provide clearer images of the breast if you have dense breast tissue.  *3D mammography is an optional exam that anyone can have with a 2D mammogram. It doesn't replace or take the place of a 2D mammogram. 2D mammograms remain an effective screening test for all women.  Not all insurance companies cover the cost of a 3D mammogram. Check with your insurance.            Nov 08, 2018  1:15 PM CST   Return Visit with Ayde Terry MD   [facility] ([facility])    [facility] [address]  [address]  [facility] [address]   [phone]            Nov 20, 2018 10:30 AM CST   (Arrive by 10:15 AM)   RETURN ARRHYTHMIA with [place] Vasquez MD   [facility] ([facility])    [address]  [address]  [facility] [address] [phone]            Apr 30, 2019 10:00 AM CDT   (Arrive by 9:45 AM)   Pacemaker Check with [facility] Cv Device 1   M " [facility] ([facility])    [address]  3rd Floor  [address]               Apr 30, 2019 10:30 AM CDT   (Arrive by 10:15 AM)   RETURN ARRHYTHMIA with [place] Vasquez MD   [facility] ([facility])    [address]  [address]  [facility] [address] [phone]              Future tests that were ordered for you today     Open Future Orders        Priority Expected Expires Ordered    Follow-Up with Electrophysiologist Routine 11/20/2018 10/30/2019 10/30/2018            Who to contact     Please call your clinic at No information on file. to:    Ask questions about your health    Make or cancel appointments    Discuss your medicines    Learn about your test results    Speak to your doctor            Additional Information About Your Visit        [facility] Information     [facility] gives you secure access to your electronic health record. If you see a primary care provider, you can also send messages to your care team and make appointments. If you have questions, please call your primary care clinic.  If you do not have a primary care provider, please call [phone] and they will assist you.      [facility] is an electronic gateway that provides easy, online access to your medical records. With [facility], you can request a clinic appointment, read your test results, renew a prescription or communicate with your care team.     To access your existing account, please contact your [facility] or call [phone] for assistance.        Care EveryWhere ID     This is your Care EveryWhere ID. This could be used by other organizations to access your [place] medical records  [id number]         Blood Pressure from Last 3 Encounters:   10/30/18 140/86   04/11/18 119/75   10/05/17 126/74    Weight from Last 3 Encounters:   10/30/18 113.4 kg (250 lb)   04/11/18 112.5 kg (248 lb 1.6 oz)   10/05/17 108.4 kg (238 lb 14.4  oz)              We Performed the Following     PM DEVICE PROGRAMMING EVAL, DUAL LEAD PACER          Today's Medication Changes          These changes are accurate as of 10/30/18  6:37 PM.  If you have any questions, ask your nurse or doctor.               Start taking these medicines.        Dose/Directions    atenolol 25 MG tablet   Commonly known as:  TENORMIN   Used for:  Nonsustained ventricular tachycardia (H), Permanent atrial fibrillation (H)   Started by:  [place] Vasquez MD        Dose:  25 mg   Take 1 tablet (25 mg) by mouth daily   Quantity:  90 tablet   Refills:  3         Stop taking these medicines if you haven't already. Please contact your care team if you have questions.     aspirin 81 MG EC tablet   Stopped by:  [place] Vasquez MD                Where to get your medicines      These medications were sent to [facility] [address]  [address] [address]    Hours:  test rx sent successfully  2/8/05   Phone:  [phone]     atenolol 25 MG tablet                Primary Care Provider Office Phone # Fax #    Michele Hernandez [phone]       [address] [place] [address]        Equal Access to Services     [facility] Hospital[facility]SEFINA AH: Hadii aad ku hadasho Soomaali, waaxda luqadaha, qaybta kaalmada adeegyada, waxay radhain hayangelican yohannes vo ah. So [facility] [phone].    ATENCIÓN: Si habla español, tiene a barker disposición servicios gratuitos de asistencia lingüística. Fiordaliza al [phone].    We comply with applicable federal civil rights laws and [place] laws. We do not discriminate on the basis of race, color, national origin, age, disability, sex, sexual orientation, or gender identity.            Thank you!     Thank you for choosing [facility]  for your care. Our goal is always to provide you with excellent care. Hearing back from our patients is one way we can continue to improve our  services. Please take a few minutes to complete the written survey that you may receive in the mail after your visit with us. Thank you!             Your Updated Medication List - Protect others around you: Learn how to safely use, store and throw away your medicines at www.disposemymeds.org.          This list is accurate as of 10/30/18  6:37 PM.  Always use your most recent med list.                   Brand Name Dispense Instructions for use Diagnosis    atenolol 25 MG tablet    TENORMIN    90 tablet    Take 1 tablet (25 mg) by mouth daily    Nonsustained ventricular tachycardia (H), Permanent atrial fibrillation (H)       blood glucose monitoring lancets     3 Box    Use to test blood sugar 3 times daily    Hyperglycemia       blood glucose monitoring meter device kit     1 kit    Use to test blood sugars 1-3 times daily or as directed.    Type 2 diabetes mellitus without complication, without long-term current use of insulin (H)       blood glucose monitoring test strip    JESUSITA CONTOUR NEXT    270 each    Test THREE TIMES PER DAY    Type 2 diabetes mellitus without complication, without long-term current use of insulin (H)       levalbuterol 45 MCG/ACT Inhaler    XOPENEX HFA     Inhale 2 puffs into the lungs every 4 hours as needed for shortness of breath / dyspnea or wheezing        levothyroxine 112 MCG tablet    SYNTHROID/LEVOTHROID    90 tablet    Take 1 tablet (112 mcg) by mouth daily    Hashimoto's thyroiditis       MELATONIN PO      Take 5 mg by mouth        TYLENOL 325 MG tablet   Generic drug:  acetaminophen      Take 325-650 mg by mouth every 6 hours as needed for mild pain Take 2000 mg daily        * warfarin 2 MG tablet    COUMADIN     Take 2 mg by mouth daily        * warfarin 4 MG tablet    COUMADIN    135 tablet    Take 1 1/2 tablets daily or as directed by [facility] clinic    A-fib (H)       * warfarin 2 MG tablet    COUMADIN    270 tablet    3 tablets (6mg) daily as directed by [facility]    Atrial  fibrillation with RVR (H)       * Notice:  This list has 3 medication(s) that are the same as other medications prescribed for you. Read the directions carefully, and ask your doctor or other care provider to review them with you.

## 2018-10-30 NOTE — PATIENT INSTRUCTIONS
You will be scheduled for a follow up visit: 3 weeks    New Medications: Atenolol 25 mg daily    Testing Scheduled:  Echocardiogram today at 6:00 pm    We encourage you to use My Chart as your primary form of communication if possible. If you need assistance in setting this up, please contact our office or ask at your follow up visit.     If you need a medication refill please contact your pharmacy. Please allow at least 3 business days for your refill to be completed.       Cardiology  Telephone Number    Mylene Bernard [phone]   Or send a message to your provider via my chart.   For scheduling procedures:    [facility]    Clinic appointments       [phone]   For the Device Clinic (Pacemakers and ICD's)   RN's :   Marissa Sorensen  During business hours: [phone]    After business hours:   [phone]- select option 4 and ask for job code [id number].          As always, Thank you for trusting us with your health care needs!

## 2018-10-30 NOTE — PROGRESS NOTES
Preliminary Device Interrogation Results.  Final physician signed paceart report to be scanned and attached.    Pt seen in Clinic for evaluation and iterative programming of a Medtronic Dual lead pacemaker, per MD orders. Today her intrinsic rhythm is A-fib with  @ 30 bpm. Normal pacemaker function. 3 high ventricular rate episodes have been recorded. The longest episode was 3 sec @ 162 bpm  fastest episode was 1 sec @ 217 bpm.  0 short v-v intervals recorded. Lead trends appear stable. = 99.7%. Pt reports she is feeling well. She reports she feels easily fatigued when doing her gardening. Activity threshold changed from Medium low to low. Battery estimates 7 years to ISACC. Plan for pt to send a remote transmission in 3 months and RTC in 6 months.    Dual lead pacemaker

## 2018-10-30 NOTE — PROGRESS NOTES
"HPI:  Juju Vaughan is a 73 year old woman who presents for evaluation and management of permanent atrial fibrillation. She has previously seen Dr. Doug Hodgkin at [facility]. She has both ASA and warfarin listed among her medications. She reports having had 3 ablations and a pacemaker with an AV node ablation at the time of implant.  A note from Dr. Hodgkin indicates that she has permanent atrial fibrillation, a pacemaker implant 1/30/2017 (appears to have been dual chamber as P-wave data was indicated), secundum ASD repair 1989, typical atrial flutter ablation 2/2012, PVI 12/11/2014, flutter ablation 7/2016. She has had cardioversions 12/2013, 10/2014, 9/2015, 4/2016 and 1/2017 (in [place]). She has type II diabetes. Her outside charts indicate an apparently incorrect diagnosis of CAD: an angiogram 6/28/2017 reports left main is  large, normal ; \"LAD is large with small diags. Normal ;  LCx is large dominant vessel with large OM and small distal PDA. Normal.  and  RCA is small, normal vessel . No LV gram was done.  She appears to have had an AV node ablation around January 2017 with a dual chamber PPM implanted (note from Dr. Hodgkin 1/30/2017 includes  Reason for follow up: Atrial Fibrillation s/p PPM AVNA .  She felt much better after her AV node ablation.  She denies chest pain, shortness of breath, lightheadedness, syncope or palpitations.  She does not think she has as much energy as she used to, but denies orthopnea or PND.  She said that she saw a physician at one point who told her that her BNP was elevated.  She gives a history of diabetes but is not aware of any diagnosis of hypertension.  She denies MI or cerebrovascular accident.  She confirmed that she is taking both aspirin and warfarin.  She has tried dabigatran, rivaroxaban and apixaban and had problems with side effects with all of them.  She denies bleeding problems on warfarin and denies symptoms suggestive of embolic events.  She " "smoked a few cigarettes in her 20s.  She has not smoked since then.  She drinks occasional alcohol.  Family history is positive for coronary artery disease but there is does not appear to be a family history of sudden cardiac death.  Pacemaker check today shows normal dual chamber function.  She is in AF.  There were 3 episodes of NSVT on 3 separate days, lasting 1-3 seconds.      PAST MEDICAL HISTORY:  Past Medical History:   Diagnosis Date     A-fib (H)     Chronic anticoagulation and antiarrhythmic drugs; has required multiple conversions     Antiplatelet or antithrombotic long-term use      Arrhythmia      ASD (atrial septal defect)     repaired at age 40     Difficulty in walking(719.7)      Eating disorder Current    Food addiction per patient; Sees private therapist at [facility]     Hypothyroidism (acquired)     Due to \"hot nodule,\" saw endocrine at [place] and was given radiation therapy, which \"killed the whole thyroid.\" No reported malignancy. Now follows with Endocrine at [facility] and on levothyroxine.     Irregular heart beat      Obesity      Obstructive sleep apnea Current    Uses CPAP; sees specialist     Rh incompatibility      Shortness of breath      Type 2 diabetes mellitus without complication (H)      Walking troubles        CURRENT MEDICATIONS:  Current Outpatient Prescriptions   Medication Sig Dispense Refill     acetaminophen (TYLENOL) 325 MG tablet Take 325-650 mg by mouth every 6 hours as needed for mild pain Take 2000 mg daily       aspirin EC 81 MG EC tablet Take 81 mg by mouth       atenolol (TENORMIN) 25 MG tablet Take 1 tablet (25 mg) by mouth daily 90 tablet 3     blood glucose monitoring (JESUSITA CONTOUR MONITOR) meter device kit Use to test blood sugars 1-3 times daily or as directed. 1 kit 0     blood glucose monitoring (JESUSITA CONTOUR NEXT) test strip Test THREE TIMES PER  each 3     blood glucose monitoring (JESUSITA MICROLET) lancets Use to test blood sugar 3 times daily 3 Box 3     " levothyroxine (SYNTHROID/LEVOTHROID) 112 MCG tablet Take 1 tablet (112 mcg) by mouth daily 90 tablet 3     MELATONIN PO Take 5 mg by mouth       warfarin (COUMADIN) 2 MG tablet 3 tablets (6mg) daily as directed by  tablet 0     warfarin (COUMADIN) 2 MG tablet Take 2 mg by mouth daily       warfarin (COUMADIN) 4 MG tablet Take 1 1/2 tablets daily or as directed by [facility] clinic 135 tablet 0     levalbuterol (XOPENEX HFA) 45 MCG/ACT inhaler Inhale 2 puffs into the lungs every 4 hours as needed for shortness of breath / dyspnea or wheezing         PAST SURGICAL HISTORY:  Past Surgical History:   Procedure Laterality Date     BIOPSY       COLONOSCOPY N/A 3/28/2016    Procedure: COMBINED COLONOSCOPY, SINGLE OR MULTIPLE BIOPSY/POLYPECTOMY BY BIOPSY;  Surgeon: Angela Herbert MD;  Location: [facility]     CYSTOSCOPY N/A 9/24/2014    Procedure: CYSTOSCOPY;  Surgeon: Anamaria Sher MD;  Location: [facility]     DAVINCI HYSTERECTOMY TOTAL, BILATERAL SALPINGO-OOPHORECTOMY, COMBINED N/A 9/24/2014    Procedure: COMBINED DAVINCI HYSTERECTOMY TOTAL, SALPINGO-OOPHORECTOMY;  Surgeon: Anamaria Sher MD;  Location: [facility]     H ABLATION FOCAL AFIB       HAND SURGERY      Repair after broken hand from fall; metal kiko placed between radius and ulna     KNEE SURGERY       ORTHOPEDIC SURGERY       REPAIR ATRIAL SEPTAL DEFECT      Age 40     THORACIC SURGERY       TONSILLECTOMY       TUBAL LIGATION      After second vaginal delivery     VASCULAR SURGERY         ALLERGIES:     Allergies   Allergen Reactions     Animal Dander      Contrast Dye Other (See Comments)     Family history of reaction.  Patient has not had reaction.       Flecainide Muscle Pain (Myalgia)     Gin [Alcohol]      Iodine I 131 Tositumomab      Morphine Hcl Itching     Percocet [Oxycodone-Acetaminophen]      Perfume Difficulty breathing     Seafood      Dehydrated with high fever     Tape [Adhesive Tape] Blisters     All tape, dermabond, except paper tape      "Vicodin [Hydrocodone-Acetaminophen] Other (See Comments)     Flu-like symptoms     Walnuts [Nuts]        FAMILY HISTORY:  Family History   Problem Relation Age of Onset     Breast Cancer No family hx of      Cancer - colorectal No family hx of      Anxiety Disorder No family hx of      Diabetes No family hx of      Hypertension No family hx of      Osteoporosis No family hx of      Cerebrovascular Disease No family hx of      Obesity No family hx of        SOCIAL HISTORY:  Social History   Substance Use Topics     Smoking status: Never Smoker     Smokeless tobacco: Never Used     Alcohol use No       ROS:   Constitutional: No fever, chills, or sweats. Weight stable.   ENT: No visual disturbance, ear ache, epistaxis, sore throat.   Cardiovascular: As per HPI.   Respiratory: No cough, hemoptysis.    GI: No nausea, vomiting, hematemesis, melena, or hematochezia.   : No hematuria.   Integument: Negative.   Psychiatric: Negative.   Hematologic:  Easy bruising, no easy bleeding.  Neuro: Negative.   Endocrinology: No significant heat or cold intolerance   Musculoskeletal: No myalgia.    Exam:  /86 (BP Location: Right arm, Patient Position: Chair, Cuff Size: Adult Large)  Pulse 74  Ht 1.727 m (5' 8\")  Wt 113.4 kg (250 lb)  SpO2 98%  BMI 38.01 kg/m2  No acute distress.  Alert and oriented.  HEENT: EOMI, PERRL, oral mucosa moist, palate elevates symmetrically  Neck: No carotid bruits.  No lymphadenopathy.  JVP 6 cm without HJR.  Cor: RRR (paced). Normal S1 and S2.  No murmur, rub, or gallop.  Lungs:  Clear  Abd: Soft, nontender, bowel sounds present.  No hepatosplenomegaly..  Extremities: No C/C.  1+ edema.  Pulses (R/L, 4=normal):  Carotids 4/4, Radials 4/4, DP 4/4, TP 4/4  Neuro: Grossly intact.    Labs:  CBC RESULTS:   Lab Results   Component Value Date    WBC 6.5 10/05/2017    RBC 4.87 10/05/2017    HGB 13.9 10/05/2017    HCT 44.6 10/05/2017    MCV 92 10/05/2017    MCH 28.5 10/05/2017    MCHC 31.2 (L) " 10/05/2017    RDW 15.7 (H) 10/05/2017     10/05/2017       BMP RESULTS:  Lab Results   Component Value Date     10/05/2017    POTASSIUM 4.2 10/05/2017    CHLORIDE 106 10/05/2017    CO2 27 10/05/2017    ANIONGAP 8 10/05/2017     (H) 10/05/2017    BUN 13 10/05/2017    CR 0.71 10/05/2017    GFRESTIMATED 81 10/05/2017    GFRESTBLACK >90 10/05/2017    MAKENNA 9.1 10/05/2017        INR RESULTS:  Lab Results   Component Value Date    INR 2.7 (A) 10/31/2017    INR 2.2 (A) 10/20/2017    INR 1.6 (A) 10/05/2017    INR 2.0 (A) 09/25/2017    INR 1.07 09/24/2014    INR 2.94 (H) 12/28/2010    INR 1.34 (H) 12/08/2010    INR 1.42 (H) 10/18/2010       Procedures:  Echocardiogram: No results found for this or any previous visit (from the past 8760 hour(s)).    Assessment and Plan:  Juju Vaughan is a 73 year old woman with permanent atrial fibrillation, s/p multiple atrial ablation procedures culminating in an AV node ablation and pacemaker implant in January 2017.  She feels very well.  She has not had an evaluation of LV function since her pacemaker implant.  She has type II diabetes. She does not have CAD but is on ASA as well as warfarin.  I have recommended that she discontinue ASA.  I would like to obtain a echo to assess her LV function.  It is possible that her decrease in exercise tolerance is related to a loss in LV function.  Pacemaker evaluation shows normal function.  There were a few episodes of NSVT.  I have asked her to start atenolol 25 mg daily.  She did not tolerate metoprolol prior to her AV node ablation, but she was probably taking a relatively higher dose of that beta-blocker.  We will arrange follow-up in a few weeks after the echo but will cancel it if her LV function is normal.  It was a pleasure meeting Juju Vaughan today.  Henry Vasquez    ADDENDUM (1 Nov 2018)  Echo (10/30/2018) shows normal LV function, EF 60-65%.  Moderate biatrial enlargement.  Scott Sakaguchi CC HODGKIN,  VIVIENNE

## 2018-10-30 NOTE — LETTER
"10/30/2018      RE: Juju Vaughan  [id number] [facility] [address]       Dear Colleague,    Thank you for the opportunity to participate in the care of your patient, Juju Vaughan, at the [facility]. Please see a copy of my visit note below.    HPI:  Juju Vaughan is a 73 year old woman who presents for evaluation and management of permanent atrial fibrillation. She has previously seen Dr. Doug Hodgkin at [facility]. She has both ASA and warfarin listed among her medications. She reports having had 3 ablations and a pacemaker with an AV node ablation at the time of implant.  A note from Dr. Hodgkin indicates that she has permanent atrial fibrillation, a pacemaker implant 1/30/2017 (appears to have been dual chamber as P-wave data was indicated), secundum ASD repair 1989, typical atrial flutter ablation 2/2012, PVI 12/11/2014, flutter ablation 7/2016. She has had cardioversions 12/2013, 10/2014, 9/2015, 4/2016 and 1/2017 (in [place]). She has type II diabetes. Her outside charts indicate an apparently incorrect diagnosis of CAD: an angiogram 6/28/2017 reports left main is  large, normal ; \"LAD is large with small diags. Normal ;  LCx is large dominant vessel with large OM and small distal PDA. Normal.  and  RCA is small, normal vessel . No LV gram was done.  She appears to have had an AV node ablation around January 2017 with a dual chamber PPM implanted (note from Dr. Hodgkin 1/30/2017 includes  Reason for follow up: Atrial Fibrillation s/p PPM AVNA .  She felt much better after her AV node ablation.  She denies chest pain, shortness of breath, lightheadedness, syncope or palpitations.  She does not think she has as much energy as she used to, but denies orthopnea or PND.  She said that she saw a physician at one point who told her that her BNP was elevated.  She gives a history of diabetes but is not aware of any diagnosis of " "hypertension.  She denies MI or cerebrovascular accident.  She confirmed that she is taking both aspirin and warfarin.  She has tried dabigatran, rivaroxaban and apixaban and had problems with side effects with all of them.  She denies bleeding problems on warfarin and denies symptoms suggestive of embolic events.  She smoked a few cigarettes in her 20s.  She has not smoked since then.  She drinks occasional alcohol.  Family history is positive for coronary artery disease but there is does not appear to be a family history of sudden cardiac death.  Pacemaker check today shows normal dual chamber function.  She is in AF.  There were 3 episodes of NSVT on 3 separate days, lasting 1-3 seconds.      PAST MEDICAL HISTORY:  Past Medical History:   Diagnosis Date     A-fib (H)     Chronic anticoagulation and antiarrhythmic drugs; has required multiple conversions     Antiplatelet or antithrombotic long-term use      Arrhythmia      ASD (atrial septal defect)     repaired at age 40     Difficulty in walking(719.7)      Eating disorder Current    Food addiction per patient; Sees private therapist at [facility]     Hypothyroidism (acquired)     Due to \"hot nodule,\" saw endocrine at [place] and was given radiation therapy, which \"killed the whole thyroid.\" No reported malignancy. Now follows with Endocrine at [facility] and on levothyroxine.     Irregular heart beat      Obesity      Obstructive sleep apnea Current    Uses CPAP; sees specialist     Rh incompatibility      Shortness of breath      Type 2 diabetes mellitus without complication (H)      Walking troubles        CURRENT MEDICATIONS:  Current Outpatient Prescriptions   Medication Sig Dispense Refill     acetaminophen (TYLENOL) 325 MG tablet Take 325-650 mg by mouth every 6 hours as needed for mild pain Take 2000 mg daily       aspirin EC 81 MG EC tablet Take 81 mg by mouth       atenolol (TENORMIN) 25 MG tablet Take 1 tablet (25 mg) by mouth daily 90 tablet 3     blood glucose " monitoring (JESUSITA CONTOUR MONITOR) meter device kit Use to test blood sugars 1-3 times daily or as directed. 1 kit 0     blood glucose monitoring (JESUSITA CONTOUR NEXT) test strip Test THREE TIMES PER  each 3     blood glucose monitoring (JESUSITA MICROLET) lancets Use to test blood sugar 3 times daily 3 Box 3     levothyroxine (SYNTHROID/LEVOTHROID) 112 MCG tablet Take 1 tablet (112 mcg) by mouth daily 90 tablet 3     MELATONIN PO Take 5 mg by mouth       warfarin (COUMADIN) 2 MG tablet 3 tablets (6mg) daily as directed by  tablet 0     warfarin (COUMADIN) 2 MG tablet Take 2 mg by mouth daily       warfarin (COUMADIN) 4 MG tablet Take 1 1/2 tablets daily or as directed by [facility] 135 tablet 0     levalbuterol (XOPENEX HFA) 45 MCG/ACT inhaler Inhale 2 puffs into the lungs every 4 hours as needed for shortness of breath / dyspnea or wheezing         PAST SURGICAL HISTORY:  Past Surgical History:   Procedure Laterality Date     BIOPSY       COLONOSCOPY N/A 3/28/2016    Procedure: COMBINED COLONOSCOPY, SINGLE OR MULTIPLE BIOPSY/POLYPECTOMY BY BIOPSY;  Surgeon: Angela Herbert MD;  Location: [facility]     CYSTOSCOPY N/A 9/24/2014    Procedure: CYSTOSCOPY;  Surgeon: Anamaria Sher MD;  [facility]: [facility]     DAVINCI HYSTERECTOMY TOTAL, BILATERAL SALPINGO-OOPHORECTOMY, COMBINED N/A 9/24/2014    Procedure: COMBINED DAVINCI HYSTERECTOMY TOTAL, SALPINGO-OOPHORECTOMY;  Surgeon: Anamaria Sher MD;  Location: [facility]     H ABLATION FOCAL AFIB       HAND SURGERY      Repair after broken hand from fall; metal kiko placed between radius and ulna     KNEE SURGERY       ORTHOPEDIC SURGERY       REPAIR ATRIAL SEPTAL DEFECT      Age 40     THORACIC SURGERY       TONSILLECTOMY       TUBAL LIGATION      After second vaginal delivery     VASCULAR SURGERY         ALLERGIES:     Allergies   Allergen Reactions     Animal Dander      Contrast Dye Other (See Comments)     Family history of reaction.  Patient has not had  "reaction.       Flecainide Muscle Pain (Myalgia)     Gin [Alcohol]      Iodine I 131 Tositumomab      Morphine Hcl Itching     Percocet [Oxycodone-Acetaminophen]      Perfume Difficulty breathing     Seafood      Dehydrated with high fever     Tape [Adhesive Tape] Blisters     All tape, dermabond, except paper tape     Vicodin [Hydrocodone-Acetaminophen] Other (See Comments)     Flu-like symptoms     Walnuts [Nuts]        FAMILY HISTORY:  Family History   Problem Relation Age of Onset     Breast Cancer No family hx of      Cancer - colorectal No family hx of      Anxiety Disorder No family hx of      Diabetes No family hx of      Hypertension No family hx of      Osteoporosis No family hx of      Cerebrovascular Disease No family hx of      Obesity No family hx of        SOCIAL HISTORY:  Social History   Substance Use Topics     Smoking status: Never Smoker     Smokeless tobacco: Never Used     Alcohol use No       ROS:   Constitutional: No fever, chills, or sweats. Weight stable.   ENT: No visual disturbance, ear ache, epistaxis, sore throat.   Cardiovascular: As per HPI.   Respiratory: No cough, hemoptysis.    GI: No nausea, vomiting, hematemesis, melena, or hematochezia.   : No hematuria.   Integument: Negative.   Psychiatric: Negative.   Hematologic:  Easy bruising, no easy bleeding.  Neuro: Negative.   Endocrinology: No significant heat or cold intolerance   Musculoskeletal: No myalgia.    Exam:  /86 (BP Location: Right arm, Patient Position: Chair, Cuff Size: Adult Large)  Pulse 74  Ht 1.727 m (5' 8\")  Wt 113.4 kg (250 lb)  SpO2 98%  BMI 38.01 kg/m2  No acute distress.  Alert and oriented.  HEENT: EOMI, PERRL, oral mucosa moist, palate elevates symmetrically  Neck: No carotid bruits.  No lymphadenopathy.  JVP 6 cm without HJR.  Cor: RRR (paced). Normal S1 and S2.  No murmur, rub, or gallop.  Lungs:  Clear  Abd: Soft, nontender, bowel sounds present.  No hepatosplenomegaly..  Extremities: No C/C.  " 1+ edema.  Pulses (R/L, 4=normal):  Carotids 4/4, Radials 4/4, DP 4/4, TP 4/4  Neuro: Grossly intact.    Labs:  CBC RESULTS:   Lab Results   Component Value Date    WBC 6.5 10/05/2017    RBC 4.87 10/05/2017    HGB 13.9 10/05/2017    HCT 44.6 10/05/2017    MCV 92 10/05/2017    MCH 28.5 10/05/2017    MCHC 31.2 (L) 10/05/2017    RDW 15.7 (H) 10/05/2017     10/05/2017       BMP RESULTS:  Lab Results   Component Value Date     10/05/2017    POTASSIUM 4.2 10/05/2017    CHLORIDE 106 10/05/2017    CO2 27 10/05/2017    ANIONGAP 8 10/05/2017     (H) 10/05/2017    BUN 13 10/05/2017    CR 0.71 10/05/2017    GFRESTIMATED 81 10/05/2017    GFRESTBLACK >90 10/05/2017    MAKENNA 9.1 10/05/2017        INR RESULTS:  Lab Results   Component Value Date    INR 2.7 (A) 10/31/2017    INR 2.2 (A) 10/20/2017    INR 1.6 (A) 10/05/2017    INR 2.0 (A) 09/25/2017    INR 1.07 09/24/2014    INR 2.94 (H) 12/28/2010    INR 1.34 (H) 12/08/2010    INR 1.42 (H) 10/18/2010       Procedures:  Echocardiogram: No results found for this or any previous visit (from the past 8760 hour(s)).    Assessment and Plan:  Juju Vaughan is a 73 year old woman with permanent atrial fibrillation, s/p multiple atrial ablation procedures culminating in an AV node ablation and pacemaker implant in January 2017.  She feels very well.  She has not had an evaluation of LV function since her pacemaker implant.  She has type II diabetes. She does not have CAD but is on ASA as well as warfarin.  I have recommended that she discontinue ASA.  I would like to obtain a echo to assess her LV function.  It is possible that her decrease in exercise tolerance is related to a loss in LV function.  Pacemaker evaluation shows normal function.  There were a few episodes of NSVT.  I have asked her to start atenolol 25 mg daily.  She did not tolerate metoprolol prior to her AV node ablation, but she was probably taking a relatively higher dose of that beta-blocker.  We will  arrange follow-up in a few weeks after the echo but will cancel it if her LV function is normal.  It was a pleasure meeting Juju Vaughan today.  Scott Sakaguchi CC HODGKIN, DOUGLAS

## 2018-10-30 NOTE — PATIENT INSTRUCTIONS
It was a pleasure to see you in the clinic today. Please do not hesitate to call with any questions or concerns. Your next scheduled remote transmission will be in 3 months and we look forward to seeing you in clinic at your next device check in 6 months.     Cristina Becerra RN, BSN  Electrophysiology Nurse Clinician  [facility] [place]    During Business hours Please Call: [phone]  After Hours Please Call: [phone] - select option number 4 and ask for job code [id number]

## 2018-10-30 NOTE — MR AVS SNAPSHOT
MRN:[id number]                      After Visit Summary   10/30/2018    Juju Vaughan    MRN: [id number]           Visit Information        Provider Department      10/30/2018 10:00 AM 1, Martin Cv Device  [facility]        Your next 10 appointments already scheduled     Oct 30, 2018  6:00 PM CDT   Ech Complete with UCECHCR2    [facility] ([facility])    [address] [facility]  3rd Floor  [facility] [id number] [phone]           1.  Please bring or wear a comfortable two-piece outfit. 2.  You may eat, drink and take your normal medicines. 3.  For any questions that cannot be answered, please contact the ordering physician 4.  Please do not wear perfumes or scented lotions on the day of your exam.            Nov 08, 2018  1:00 PM CST   MA SCREENING DIGITAL BILATERAL with URBCMA1   [facility] ([facility])    [address] [place] [address], [address]  [facility] [id number] [phone]           How do I prepare for my exam? (Food and drink instructions) No Food and Drink Restrictions.  How do I prepare for my exam? (Other instructions) Do not use any powder, lotion or deodorant under your arms or on your breast. If you do, we will ask you to remove it before your exam.  What should I wear: Wear comfortable, two-piece clothing.  How long does the exam take: Most scans will take 15 minutes.  What should I bring: Bring any previous mammograms from other facilities or have them mailed to the breast center.  Do I need a :  No  is needed.  What do I need to tell my doctor: If you have any allergies, tell your care team.  What should I do after the exam: No restrictions, You may resume normal activities.  What is this test: This test is an x-ray of the breast to look for breast disease. The breast is pressed between two plates to flatten and spread the tissue. An X-ray is taken of the breast from different angles.  Who should I call with  "questions: If you have any questions, please call the Imaging Department where you will have your exam. Directions, parking instructions, and other information is available on our website, [email or web address].  Other information about my exam Three-dimensional (3D) mammograms are available at [facility] locations in [place], [place], [place], [place], [facility], [place], [facility] and [place]. [facility] locations include [place] and the [facility] and Surgery Center in [place].  Benefits of 3D mammograms include * Improved rate of cancer detection * Decreases your chance of having to go back for more tests, which means fewer: * \"False-positive\" results (This means that there is an abnormal area but it isn't cancer.) * Invasive testing procedures, such as a biopsy or surgery * Can provide clearer images of the breast if you have dense breast tissue.  *3D mammography is an optional exam that anyone can have with a 2D mammogram. It doesn't replace or take the place of a 2D mammogram. 2D mammograms remain an effective screening test for all women.  Not all insurance companies cover the cost of a 3D mammogram. Check with your insurance.            Nov 08, 2018  1:15 PM CST   Return Visit with Ayde Terry MD   [facility] ([facility])    [place] [facility] [address]  3rd Flr,[address]  [address]  [facility] [address]   [phone]            Nov 20, 2018 10:30 AM CST   (Arrive by 10:15 AM)   RETURN ARRHYTHMIA with Henry Vasquez MD   [facility])    [address]  [address]  [facility] [address]   [phone]            Apr 30, 2019 10:00 AM CDT   (Arrive by 9:45 AM)   Pacemaker Check with Uc Cv Device 1   [facility])    [address]  3rd Floor  [address]               Apr 30, 2019 10:30 AM CDT   (Arrive by 10:15 AM)   RETURN ARRHYTHMIA with " Henry Vasquez MD   [facility] ([facility])    [address] [facility]  [address] [place] [address] [phone]              Care Instructions    It was a pleasure to see you in the clinic today. Please do not hesitate to call with any questions or concerns. Your next scheduled remote transmission will be in 3 months and we look forward to seeing you in clinic at your next device check in 6 months.     Cristina Becerra RN, BSN  Electrophysiology Nurse Clinician  [facility]    During Business hours Please Call: [phone]  After Hours Please Call: [phone] - select option number 4 and ask for job code [id number]                 "[employer] Information     "[employer] gives you secure access to your electronic health record. If you see a primary care provider, you can also send messages to your care team and make appointments. If you have questions, please call your primary care clinic.  If you do not have a primary care provider, please call [phone] and they will assist you.      "[employer] is an electronic gateway that provides easy, online access to your medical records. With "[employer] you can request a clinic appointment, read your test results, renew a prescription or communicate with your care team.     To access your existing account, please contact your [facility] or call [phone] for assistance.        Care EveryWhere ID     This is your Care EveryWhere ID. This could be used by other organizations to access your [facility] medical records  [id number]        Equal Access to Services     GERSON MARES : Hadii randy Flores, watarun wilson, qaybori carrliloalmasaadia king. So [facility] [phone].    ATENCIÓN: Si habla español, tiene a barker disposición servicios gratuitos de asistencia lingüística. Llame al [phone].    We comply with applicable federal civil rights  laws and [place] laws. We do not discriminate on the basis of race, color, national origin, age, disability, sex, sexual orientation, or gender identity.

## 2018-10-30 NOTE — NURSING NOTE
Chief Complaint   Patient presents with     New Patient     atrial fibrillation,CHB, CAD/CHF     Medications reviewed and vitals performed.  Maria Esther Brock CMA

## 2018-10-30 NOTE — MR AVS SNAPSHOT
After Visit Summary   10/30/2018    Juju Vaughan    MRN: [id number]           Patient Information     Date Of Birth          1945        Visit Information        Provider Department      10/30/2018 10:30 AM Henry Vasquez MD [facility] Care        Today's Diagnoses     Nonsustained ventricular tachycardia (H)    -  1    Persistent atrial fibrillation (H)        Cardiac pacemaker in situ          Care Instructions    You will be scheduled for a follow up visit: 3 weeks    New Medications: Atenolol 25 mg daily    Testing Scheduled:  Echocardiogram today at 6:00 pm    We encourage you to use My Chart as your primary form of communication if possible. If you need assistance in setting this up, please contact our office or ask at your follow up visit.     If you need a medication refill please contact your pharmacy. Please allow at least 3 business days for your refill to be completed.       Cardiology  Telephone Number    Mylene Bernard [phone]   Or send a message to your provider via my chart.   For scheduling procedures:    [facility]    Clinic appointments       [phone]   For the Device Clinic (Pacemakers and ICD's)   RN's :   Marissa Sorensen  During business hours: [phone]    After business hours:   [phone]- select option 4 and ask for job code [id number].          As always, Thank you for trusting us with your health care needs!          Follow-ups after your visit        Additional Services     Follow-Up with Electrophysiologist                 Your next 10 appointments already scheduled     Oct 30, 2018  6:00 PM CDT   Ech Complete with UCECHCR2   [facility] ([facility])    [address] [id number] [phone]           1.  Please bring or wear a comfortable two-piece outfit. 2.  You may eat, drink and take your normal medicines. 3.  For any questions that cannot be  answered, please contact the ordering physician 4.  Please do not wear perfumes or scented lotions on the day of your exam.            Nov 08, 2018  1:00 PM CST   MA SCREENING DIGITAL BILATERAL with URBCMA1   [facility] ([facility])    [address] [place] [address], [address]  [facility] [address] [phone]           How do I prepare for my exam? (Food and drink instructions) No Food and Drink Restrictions.  How do I prepare for my exam? (Other instructions) Do not use any powder, lotion or deodorant under your arms or on your breast. If you do, we will ask you to remove it before your exam.  What should I wear: Wear comfortable, two-piece clothing.  How long does the exam take: Most scans will take 15 minutes.  What should I bring: Bring any previous mammograms from other facilities or have them mailed to the breast center.  Do I need a :  No  is needed.  What do I need to tell my doctor: If you have any allergies, tell your care team.  What should I do after the exam: No restrictions, You may resume normal activities.  What is this test: This test is an x-ray of the breast to look for breast disease. The breast is pressed between two plates to flatten and spread the tissue. An X-ray is taken of the breast from different angles.  Who should I call with questions: If you have any questions, please call the Imaging Department where you will have your exam. Directions, parking instructions, and other information is available on our website, [email or web address].  Other information about my exam Three-dimensional (3D) mammograms are available at [facility] locations in [facility], [facility], [facility], [place], [facility] and [place]. [facility] locations include [place] and the Clinics and Surgery Center in [place].  Benefits of 3D mammograms include * Improved rate of cancer detection * Decreases your chance of having to go back for more tests, which means  "fewer: * \"False-positive\" results (This means that there is an abnormal area but it isn't cancer.) * Invasive testing procedures, such as a biopsy or surgery * Can provide clearer images of the breast if you have dense breast tissue.  *3D mammography is an optional exam that anyone can have with a 2D mammogram. It doesn't replace or take the place of a 2D mammogram. 2D mammograms remain an effective screening test for all women.  Not all insurance companies cover the cost of a 3D mammogram. Check with your insurance.            Nov 08, 2018  1:15 PM CST   Return Visit with Ayde Terry MD   [facility] ([facility])    [place] [facility] [address]  [address]rd Fl[address]  [address]  [facility] [id number] [phone]            Nov 20, 2018 10:30 AM CST   (Arrive by 10:15 AM)   RETURN ARRHYTHMIA with Henry Vasquez MD   [facility] ([facility])    [address] [place] [address]  [address] [place] [address] [phone]            Apr 30, 2019 10:00 AM CDT   (Arrive by 9:45 AM)   Pacemaker Check with Uc Cv Device 1   [facility] ([facility])    [address] [place] [address]  3rd Floor  [id number]               Apr 30, 2019 10:30 AM CDT   (Arrive by 10:15 AM)   RETURN ARRHYTHMIA with Henry Vasquez MD   [facility] ([facility])    [address] [place] [address]  [address] [place] [address] [phone]              Future tests that were ordered for you today     Open Future Orders        Priority Expected Expires Ordered    Follow-Up with Electrophysiologist Routine 11/20/2018 10/30/2019 10/30/2018    Echocardiogram Routine 10/30/2018 10/30/2019 10/30/2018            Who to contact     If you have questions or need follow up information about today's clinic visit or your schedule please contact [facility] directly at [phone].  Normal or non-critical lab and imaging " "results will be communicated to you by MyChart, letter or phone within 4 business days after the clinic has received the results. If you do not hear from us within 7 days, please contact the clinic through [employer] or phone. If you have a critical or abnormal lab result, we will notify you by phone as soon as possible.  Submit refill requests through [employer] or call your pharmacy and they will forward the refill request to us. Please allow 3 business days for your refill to be completed.          Additional Information About Your Visit        [employer] Information     [employer] gives you secure access to your electronic health record. If you see a primary care provider, you can also send messages to your care team and make appointments. If you have questions, please call your primary care clinic.  If you do not have a primary care provider, please call [phone] and they will assist you.        Care EveryWhere ID     This is your Care EveryWhere ID. This could be used by other organizations to access your [facility] medical records  [id number]        Your Vitals Were     Pulse Height Pulse Oximetry BMI (Body Mass Index)          74 1.727 m (5' 8\") 98% 38.01 kg/m2         Blood Pressure from Last 3 Encounters:   10/30/18 140/86   04/11/18 119/75   10/05/17 126/74    Weight from Last 3 Encounters:   10/30/18 113.4 kg (250 lb)   04/11/18 112.5 kg (248 lb 1.6 oz)   10/05/17 108.4 kg (238 lb 14.4 oz)                 Today's Medication Changes          These changes are accurate as of 10/30/18 11:54 AM.  If you have any questions, ask your nurse or doctor.               Start taking these medicines.        Dose/Directions    atenolol 25 MG tablet   Commonly known as:  TENORMIN   Used for:  Nonsustained ventricular tachycardia (H), Persistent atrial fibrillation (H)   Started by:  Henry Vasquez MD        Dose:  25 mg   Take 1 tablet (25 mg) by mouth daily   Quantity:  90 tablet   Refills:  3         Stop taking these " medicines if you haven't already. Please contact your care team if you have questions.     aspirin 81 MG EC tablet   Stopped by:  Henry Vasquez MD                Where to get your medicines      These medications were sent to [place], [place] - [address]  [address] [facility] [address]    Hours:  test rx sent successfully  2/8/05  kr Phone:  [phone]     atenolol 25 MG tablet                Primary Care Provider Office Phone # Fax #    Michele Hernandez [phone]       [address] [place] [address]        Equal Access to Services     [facility]: Hadii aad ku hadasho Soomaali, waaxda luqadaha, qaybta kaalmada adeegyada, waxay idiin hayaan yohannes vo . So [facility] [phone].    ATENCIÓN: Si habla español, tiene a barker disposición servicios gratuitos de asistencia lingüística. [facility] [phone].    We comply with applicable federal civil rights laws and [place] laws. We do not discriminate on the basis of race, color, national origin, age, disability, sex, sexual orientation, or gender identity.            Thank you!     Thank you for choosing [facility]  for your care. Our goal is always to provide you with excellent care. Hearing back from our patients is one way we can continue to improve our services. Please take a few minutes to complete the written survey that you may receive in the mail after your visit with us. Thank you!             Your Updated Medication List - Protect others around you: Learn how to safely use, store and throw away your medicines at www.disposemymeds.org.          This list is accurate as of 10/30/18 11:54 AM.  Always use your most recent med list.                   Brand Name Dispense Instructions for use Diagnosis    atenolol 25 MG tablet    TENORMIN    90 tablet    Take 1 tablet (25 mg) by mouth daily    Nonsustained ventricular tachycardia (H), Persistent atrial  fibrillation (H)       blood glucose monitoring lancets     3 Box    Use to test blood sugar 3 times daily    Hyperglycemia       blood glucose monitoring meter device kit     1 kit    Use to test blood sugars 1-3 times daily or as directed.    Type 2 diabetes mellitus without complication, without long-term current use of insulin (H)       blood glucose monitoring test strip    JESUSITA CONTOUR NEXT    270 each    Test THREE TIMES PER DAY    Type 2 diabetes mellitus without complication, without long-term current use of insulin (H)       levalbuterol 45 MCG/ACT Inhaler    XOPENEX HFA     Inhale 2 puffs into the lungs every 4 hours as needed for shortness of breath / dyspnea or wheezing        levothyroxine 112 MCG tablet    SYNTHROID/LEVOTHROID    90 tablet    Take 1 tablet (112 mcg) by mouth daily    Hashimoto's thyroiditis       MELATONIN PO      Take 5 mg by mouth        TYLENOL 325 MG tablet   Generic drug:  acetaminophen      Take 325-650 mg by mouth every 6 hours as needed for mild pain Take 2000 mg daily        * warfarin 2 MG tablet    COUMADIN     Take 2 mg by mouth daily        * warfarin 4 MG tablet    COUMADIN    135 tablet    Take 1 1/2 tablets daily or as directed by [facility] clinic    A-fib (H)       * warfarin 2 MG tablet    COUMADIN    270 tablet    3 tablets (6mg) daily as directed by [facility]    Atrial fibrillation with RVR (H)       * Notice:  This list has 3 medication(s) that are the same as other medications prescribed for you. Read the directions carefully, and ask your doctor or other care provider to review them with you.

## 2018-11-08 NOTE — LETTER
"11/8/2018       RE: Juju Vaughan  [address] [facility] [address]     Dear Colleague,    Thank you for referring your patient, Juju Vaughan, to the [facility] at [facility]. Please see a copy of my visit note below.    CC/HPI:   Juju Vaughan is a 72 year old female who presents today for her endometrial cancer surveillance visit. In 9/2014 she was diagnosed with grade 1, stage 1A endometrial cancer and underwent Davinci TLH/BSO.      Since her last visit she has been doing well. She denies any issues with vaginal bleeding, discharge or irritation. No other changes in her health. Her Afib has not been an issue since having pacemaker placed. Recently bought new house and got a new dog. Is walking her poodle Andrew 3 miles per day.      HCM: Pap- 2016 vaginal cuff  Mammogram- 9/2017 normal  Colonoscopy- 3/2016 normal  DEXA- 6/2014 normal     HISTORIES:  Patient Active Problem List   Diagnosis     Hot thyroid nodule     Atrial fibrillation with RVR (H)     CHF (congestive heart failure) (H)     Hypothyroidism, unspecified type     Endometrial cancer (H)     CARDIOVASCULAR SCREENING; LDL GOAL LESS THAN 160     Abnormal glucose     Vitamin D deficiency     Tachycardia-bradycardia syndrome (H)     Type 2 diabetes mellitus without complication, without long-term current use of insulin (H)     Past Medical History:   Diagnosis Date     A-fib (H)     Chronic anticoagulation and antiarrhythmic drugs; has required multiple conversions     Antiplatelet or antithrombotic long-term use      Arrhythmia      ASD (atrial septal defect)     repaired at age 40     Difficulty in walking(719.7)      Eating disorder Current    Food addiction per patient; Sees private therapist at [facility]     Hypothyroidism (acquired)     Due to \"hot nodule,\" saw endocrine at [place] and was given radiation therapy, which \"killed the whole thyroid.\" No reported malignancy. Now follows with " Endocrine at [facility] and on levothyroxine.     Irregular heart beat      Obesity      Obstructive sleep apnea Current    Uses CPAP; sees specialist     Rh incompatibility      Shortness of breath      Type 2 diabetes mellitus without complication (H)      Walking troubles      Past Surgical History:   Procedure Laterality Date     BIOPSY       COLONOSCOPY N/A 3/28/2016    Procedure: COMBINED COLONOSCOPY, SINGLE OR MULTIPLE BIOPSY/POLYPECTOMY BY BIOPSY;  Surgeon: Angela Herbert MD;  Location: [facility]     CYSTOSCOPY N/A 9/24/2014    Procedure: CYSTOSCOPY;  Surgeon: Anamaria Sher MD;  Location: [facility]     DAVINCI HYSTERECTOMY TOTAL, BILATERAL SALPINGO-OOPHORECTOMY, COMBINED N/A 9/24/2014    Procedure: COMBINED DAVINCI HYSTERECTOMY TOTAL, SALPINGO-OOPHORECTOMY;  Surgeon: Anamaria Sher MD;  Location: [facility]     H ABLATION FOCAL AFIB       HAND SURGERY      Repair after broken hand from fall; metal kiko placed between radius and ulna     KNEE SURGERY       ORTHOPEDIC SURGERY       REPAIR ATRIAL SEPTAL DEFECT      Age 40     THORACIC SURGERY       TONSILLECTOMY       TUBAL LIGATION      After second vaginal delivery     VASCULAR SURGERY       Current Outpatient Prescriptions   Medication Sig Dispense Refill     acetaminophen (TYLENOL) 325 MG tablet Take 325-650 mg by mouth every 6 hours as needed for mild pain Take 2000 mg daily       atenolol (TENORMIN) 25 MG tablet Take 1 tablet (25 mg) by mouth daily 90 tablet 3     blood glucose monitoring (JESUSITA CONTOUR MONITOR) meter device kit Use to test blood sugars 1-3 times daily or as directed. 1 kit 0     blood glucose monitoring (JESUSITA CONTOUR NEXT) test strip Test THREE TIMES PER  each 3     blood glucose monitoring (JESUSITA MICROLET) lancets Use to test blood sugar 3 times daily 3 Box 3     levalbuterol (XOPENEX HFA) 45 MCG/ACT inhaler Inhale 2 puffs into the lungs every 4 hours as needed for shortness of breath / dyspnea or wheezing       levothyroxine  (SYNTHROID/LEVOTHROID) 112 MCG tablet Take 1 tablet (112 mcg) by mouth daily 90 tablet 3     MELATONIN PO Take 5 mg by mouth       warfarin (COUMADIN) 2 MG tablet 3 tablets (6mg) daily as directed by  tablet 0     warfarin (COUMADIN) 2 MG tablet Take 2 mg by mouth daily       warfarin (COUMADIN) 4 MG tablet Take 1 1/2 tablets daily or as directed by [facility] 135 tablet 0     Allergies   Allergen Reactions     Animal Dander      Contrast Dye Other (See Comments)     Family history of reaction.  Patient has not had reaction.       Flecainide Muscle Pain (Myalgia)     Gin [Alcohol]      Iodine I 131 Tositumomab      Morphine Hcl Itching     Percocet [Oxycodone-Acetaminophen]      Perfume Difficulty breathing     Seafood      Dehydrated with high fever     Tape [Adhesive Tape] Blisters     All tape, dermabond, except paper tape     Vicodin [Hydrocodone-Acetaminophen] Other (See Comments)     Flu-like symptoms     Walnuts [Nuts]      Social History     Social History     Marital status:      Spouse name: N/A     Number of children: N/A     Years of education: N/A     Occupational History     Not on file.     Social History Main Topics     Smoking status: Never Smoker     Smokeless tobacco: Never Used     Alcohol use No     Drug use: No     Sexual activity: Not Currently     Partners: Male     Birth control/ protection: Surgical      Comment: hysterectomy     Other Topics Concern     Not on file     Social History Narrative     Family History   Problem Relation Age of Onset     Breast Cancer No family hx of      Cancer - colorectal No family hx of      Anxiety Disorder No family hx of      Diabetes No family hx of      Hypertension No family hx of      Osteoporosis No family hx of      Cerebrovascular Disease No family hx of      Obesity No family hx of             Review Of Systems:  CONSTITUTIONAL: NEGATIVE for fever, chills  EYES: NEGATIVE for vision changes   RESP: NEGATIVE for significant cough or  "SOB  CV: NEGATIVE for chest pain, palpitations   GI: NEGATIVE for nausea, abdominal pain, heartburn, or change in bowel habits  : NEGATIVE for frequency, dysuria, or hematuria  MUSCULOSKELETAL: NEGATIVE for significant arthralgias or myalgia  NEURO: NEGATIVE for weakness, dizziness or paresthesias or headache    EXAM:  /80  Pulse 85  Ht 1.727 m (5' 8\")  Wt 114.1 kg (251 lb 9.6 oz)  BMI 38.26 kg/m2  Body mass index is 38.26 kg/(m^2).    General - pleasant female in no acute distress.  Skin - no suspicious lesions or rashes  EENT-  PERRLA, euthyroid with out palpable nodules  Neck - supple without lymphadenopathy.  Lungs - clear to auscultation bilaterally.  Heart - regular rate and rhythm without murmur.  Abdomen - soft, nontender, nondistended, no masses or organomegaly noted.  Musculoskeletal - no gross deformities.  Neurological - normal strength, sensation, and mental status.  Pelvic - EG: normal  female, vulva reveals no erythema or lesions.   BUS: within normal limits.  Vagina: atrophic, normal appearing vaginal cuff without lesions  Adnexa: no masses or tenderness.  Anus- normal, no lesions.  Rectovaginal - deferred.    ASSESSMENT/PLAN  Endometrial cancer surveillance visit  -Return in 6 months for follow up    Ayde Terry MD    "

## 2018-11-08 NOTE — MR AVS SNAPSHOT
After Visit Summary   11/8/2018    Juju Vaughan    MRN: [id number]           Patient Information     Date Of Birth          1945        Visit Information        Provider Department      11/8/2018 1:15 PM Ayde Terry MD [facility]        Today's Diagnoses     History of endometrial cancer    -  1       Follow-ups after your visit        Your next 10 appointments already scheduled     Apr 30, 2019 10:00 AM CDT   (Arrive by 9:45 AM)   Pacemaker Check with Uc Cv Device 1   [facility])    [address] [place] [address]  3rd Floor  [id number]               Apr 30, 2019 10:30 AM CDT   (Arrive by 10:15 AM)   RETURN ARRHYTHMIA with Henry Vasquez MD   [facility])    [address] [place] [address]  [address] [place] [id number] [phone]              Who to contact     Please call your clinic at [phone] to:    Ask questions about your health    Make or cancel appointments    Discuss your medicines    Learn about your test results    Speak to your doctor            Additional Information About Your Visit        [employer] Information     [employer] gives you secure access to your electronic health record. If you see a primary care provider, you can also send messages to your care team and make appointments. If you have questions, please call your primary care clinic.  If you do not have a primary care provider, please call [phone] and they will assist you.      [employer] is an electronic gateway that provides easy, online access to your medical records. With [employer], you can request a clinic appointment, read your test results, renew a prescription or communicate with your care team.     To access your existing account, please contact your [facility] or call [phone] for assistance.        Care EveryWhere ID     This is your Care EveryWhere ID. This  "could be used by other organizations to access your [facility] medical records  [id number]        Your Vitals Were     Pulse Height BMI (Body Mass Index)             85 1.727 m (5' 8\") 38.26 kg/m2          Blood Pressure from Last 3 Encounters:   11/08/18 137/80   10/30/18 140/86   04/11/18 119/75    Weight from Last 3 Encounters:   11/08/18 114.1 kg (251 lb 9.6 oz)   10/30/18 113.4 kg (250 lb)   04/11/18 112.5 kg (248 lb 1.6 oz)              Today, you had the following     No orders found for display       Primary Care Provider Office Phone # Fax #    Michele Hernandez [phone]       [address] [place] [id number]        Equal Access to Services     [facility] Center[facility]SEFIN[facility] : Hadii randy albertoo [place], waaxda luqadaha, qaybta kaalmada placido, saadia vo . So [facility] [phone].    ATENCIÓN: Si habla español, tiene a barker disposición servicios gratuitos de asistencia lingüística. [facility] [phone].    We comply with applicable federal civil rights laws and [place] laws. We do not discriminate on the basis of race, color, national origin, age, disability, sex, sexual orientation, or gender identity.            Thank you!     Thank you for choosing [facility]  for your care. Our goal is always to provide you with excellent care. Hearing back from our patients is one way we can continue to improve our services. Please take a few minutes to complete the written survey that you may receive in the mail after your visit with us. Thank you!             Your Updated Medication List - Protect others around you: Learn how to safely use, store and throw away your medicines at www.disposemymeds.org.          This list is accurate as of 11/8/18  7:18 PM.  Always use your most recent med list.                   Brand Name Dispense Instructions for use Diagnosis    atenolol 25 MG tablet    TENORMIN    90 tablet    Take 1 tablet (25 mg) by mouth daily "    Nonsustained ventricular tachycardia (H), Permanent atrial fibrillation (H)       blood glucose monitoring lancets     3 Box    Use to test blood sugar 3 times daily    Hyperglycemia       blood glucose monitoring meter device kit     1 kit    Use to test blood sugars 1-3 times daily or as directed.    Type 2 diabetes mellitus without complication, without long-term current use of insulin (H)       blood glucose monitoring test strip    JESUSITA CONTOUR NEXT    270 each    Test THREE TIMES PER DAY    Type 2 diabetes mellitus without complication, without long-term current use of insulin (H)       levalbuterol 45 MCG/ACT Inhaler    XOPENEX HFA     Inhale 2 puffs into the lungs every 4 hours as needed for shortness of breath / dyspnea or wheezing        levothyroxine 112 MCG tablet    SYNTHROID/LEVOTHROID    90 tablet    Take 1 tablet (112 mcg) by mouth daily    Hashimoto's thyroiditis       MELATONIN PO      Take 5 mg by mouth        TYLENOL 325 MG tablet   Generic drug:  acetaminophen      Take 325-650 mg by mouth every 6 hours as needed for mild pain Take 2000 mg daily        * warfarin 2 MG tablet    COUMADIN     Take 2 mg by mouth daily        * warfarin 4 MG tablet    COUMADIN    135 tablet    Take 1 1/2 tablets daily or as directed by [facility]    A-fib (H)       * warfarin 2 MG tablet    COUMADIN    270 tablet    3 tablets (6mg) daily as directed by [facility]    Atrial fibrillation with RVR (H)       * Notice:  This list has 3 medication(s) that are the same as other medications prescribed for you. Read the directions carefully, and ask your doctor or other care provider to review them with you.

## 2018-11-09 NOTE — PROGRESS NOTES
"CC/HPI:   Juju Vaughan is a 72 year old female who presents today for her endometrial cancer surveillance visit. In 9/2014 she was diagnosed with grade 1, stage 1A endometrial cancer and underwent Davinci TLH/BSO.      Since her last visit she has been doing well. She denies any issues with vaginal bleeding, discharge or irritation. No other changes in her health. Her Afib has not been an issue since having pacemaker placed. Recently bought new house and got a new dog. Is walking her poodle [place] 3 miles per day.      HCM: Pap- 2016 vaginal cuff  Mammogram- 9/2017 normal  Colonoscopy- 3/2016 normal  DEXA- 6/2014 normal     HISTORIES:  Patient Active Problem List   Diagnosis     Hot thyroid nodule     Atrial fibrillation with RVR (H)     CHF (congestive heart failure) (H)     Hypothyroidism, unspecified type     Endometrial cancer (H)     CARDIOVASCULAR SCREENING; LDL GOAL LESS THAN 160     Abnormal glucose     Vitamin D deficiency     Tachycardia-bradycardia syndrome (H)     Type 2 diabetes mellitus without complication, without long-term current use of insulin (H)     Past Medical History:   Diagnosis Date     A-fib (H)     Chronic anticoagulation and antiarrhythmic drugs; has required multiple conversions     Antiplatelet or antithrombotic long-term use      Arrhythmia      ASD (atrial septal defect)     repaired at age 40     Difficulty in walking(719.7)      Eating disorder Current    Food addiction per patient; Sees private therapist at [facility]     Hypothyroidism (acquired)     Due to \"hot nodule,\" saw endocrine at [place] and was given radiation therapy, which \"killed the whole thyroid.\" No reported malignancy. Now follows with Endocrine at [facility] and on levothyroxine.     Irregular heart beat      Obesity      Obstructive sleep apnea Current    Uses CPAP; sees specialist     Rh incompatibility      Shortness of breath      Type 2 diabetes mellitus without complication (H)      Walking troubles      Past Surgical " History:   Procedure Laterality Date     BIOPSY       COLONOSCOPY N/A 3/28/2016    Procedure: COMBINED COLONOSCOPY, SINGLE OR MULTIPLE BIOPSY/POLYPECTOMY BY BIOPSY;  Surgeon: Angela Herbert MD;  Location: [facility]     CYSTOSCOPY N/A 9/24/2014    Procedure: CYSTOSCOPY;  Surgeon: Anamaria Sher MD;  Location: [facility]     DAVINCI HYSTERECTOMY TOTAL, BILATERAL SALPINGO-OOPHORECTOMY, COMBINED N/A 9/24/2014    Procedure: COMBINED DAVINCI HYSTERECTOMY TOTAL, SALPINGO-OOPHORECTOMY;  Surgeon: Anamaria Sher MD;  Location: [facility]     H ABLATION FOCAL AFIB       HAND SURGERY      Repair after broken hand from fall; metal kiko placed between radius and ulna     KNEE SURGERY       ORTHOPEDIC SURGERY       REPAIR ATRIAL SEPTAL DEFECT      Age 40     THORACIC SURGERY       TONSILLECTOMY       TUBAL LIGATION      After second vaginal delivery     VASCULAR SURGERY       Current Outpatient Prescriptions   Medication Sig Dispense Refill     acetaminophen (TYLENOL) 325 MG tablet Take 325-650 mg by mouth every 6 hours as needed for mild pain Take 2000 mg daily       atenolol (TENORMIN) 25 MG tablet Take 1 tablet (25 mg) by mouth daily 90 tablet 3     blood glucose monitoring (JESUSITA CONTOUR MONITOR) meter device kit Use to test blood sugars 1-3 times daily or as directed. 1 kit 0     blood glucose monitoring (JESUSITA CONTOUR NEXT) test strip Test THREE TIMES PER  each 3     blood glucose monitoring (JESUSITA MICROLET) lancets Use to test blood sugar 3 times daily 3 Box 3     levalbuterol (XOPENEX HFA) 45 MCG/ACT inhaler Inhale 2 puffs into the lungs every 4 hours as needed for shortness of breath / dyspnea or wheezing       levothyroxine (SYNTHROID/LEVOTHROID) 112 MCG tablet Take 1 tablet (112 mcg) by mouth daily 90 tablet 3     MELATONIN PO Take 5 mg by mouth       warfarin (COUMADIN) 2 MG tablet 3 tablets (6mg) daily as directed by  tablet 0     warfarin (COUMADIN) 2 MG tablet Take 2 mg by mouth daily       warfarin  (COUMADIN) 4 MG tablet Take 1 1/2 tablets daily or as directed by [facility] 135 tablet 0     Allergies   Allergen Reactions     Animal Dander      Contrast Dye Other (See Comments)     Family history of reaction.  Patient has not had reaction.       Flecainide Muscle Pain (Myalgia)     Gin [Alcohol]      Iodine I 131 Tositumomab      Morphine Hcl Itching     Percocet [Oxycodone-Acetaminophen]      Perfume Difficulty breathing     Seafood      Dehydrated with high fever     Tape [Adhesive Tape] Blisters     All tape, dermabond, except paper tape     Vicodin [Hydrocodone-Acetaminophen] Other (See Comments)     Flu-like symptoms     Walnuts [Nuts]      Social History     Social History     Marital status:      Spouse name: N/A     Number of children: N/A     Years of education: N/A     Occupational History     Not on file.     Social History Main Topics     Smoking status: Never Smoker     Smokeless tobacco: Never Used     Alcohol use No     Drug use: No     Sexual activity: Not Currently     Partners: Male     Birth control/ protection: Surgical      Comment: hysterectomy     Other Topics Concern     Not on file     Social History Narrative     Family History   Problem Relation Age of Onset     Breast Cancer No family hx of      Cancer - colorectal No family hx of      Anxiety Disorder No family hx of      Diabetes No family hx of      Hypertension No family hx of      Osteoporosis No family hx of      Cerebrovascular Disease No family hx of      Obesity No family hx of             Review Of Systems:  CONSTITUTIONAL: NEGATIVE for fever, chills  EYES: NEGATIVE for vision changes   RESP: NEGATIVE for significant cough or SOB  CV: NEGATIVE for chest pain, palpitations   GI: NEGATIVE for nausea, abdominal pain, heartburn, or change in bowel habits  : NEGATIVE for frequency, dysuria, or hematuria  MUSCULOSKELETAL: NEGATIVE for significant arthralgias or myalgia  NEURO: NEGATIVE for weakness, dizziness or  "paresthesias or headache    EXAM:  /80  Pulse 85  Ht 1.727 m (5' 8\")  Wt 114.1 kg (251 lb 9.6 oz)  BMI 38.26 kg/m2  Body mass index is 38.26 kg/(m^2).    General - pleasant female in no acute distress.  Skin - no suspicious lesions or rashes  EENT-  PERRLA, euthyroid with out palpable nodules  Neck - supple without lymphadenopathy.  Lungs - clear to auscultation bilaterally.  Heart - regular rate and rhythm without murmur.  Abdomen - soft, nontender, nondistended, no masses or organomegaly noted.  Musculoskeletal - no gross deformities.  Neurological - normal strength, sensation, and mental status.  Pelvic - EG: normal  female, vulva reveals no erythema or lesions.   BUS: within normal limits.  Vagina: atrophic, normal appearing vaginal cuff without lesions  Adnexa: no masses or tenderness.  Anus- normal, no lesions.  Rectovaginal - deferred.    ASSESSMENT/PLAN  Endometrial cancer surveillance visit  -Return in 6 months for follow up    Ayde Terry MD    "